# Patient Record
Sex: FEMALE | HISPANIC OR LATINO | Employment: UNEMPLOYED | URBAN - METROPOLITAN AREA
[De-identification: names, ages, dates, MRNs, and addresses within clinical notes are randomized per-mention and may not be internally consistent; named-entity substitution may affect disease eponyms.]

---

## 2023-08-29 ENCOUNTER — INITIAL PRENATAL (OUTPATIENT)
Dept: OBSTETRICS AND GYNECOLOGY | Facility: CLINIC | Age: 33
End: 2023-08-29
Payer: COMMERCIAL

## 2023-08-29 VITALS — DIASTOLIC BLOOD PRESSURE: 76 MMHG | SYSTOLIC BLOOD PRESSURE: 126 MMHG | WEIGHT: 143.2 LBS

## 2023-08-29 DIAGNOSIS — Z34.91 INITIAL OBSTETRIC VISIT IN FIRST TRIMESTER: Primary | ICD-10-CM

## 2023-08-29 LAB
B-HCG UR QL: POSITIVE
EXPIRATION DATE: ABNORMAL
GLUCOSE UR STRIP-MCNC: NEGATIVE MG/DL
INTERNAL NEGATIVE CONTROL: ABNORMAL
INTERNAL POSITIVE CONTROL: ABNORMAL
Lab: ABNORMAL
PROT UR STRIP-MCNC: NEGATIVE MG/DL

## 2023-08-29 RX ORDER — VITAMIN A, ASCORBIC ACID, CHOLECALCIFEROL, .ALPHA.-TOCOPHEROL ACETATE, DL-, THIAMINE MONONITRATE, RIBOFLAVIN, NIACINAMIDE, PYRIDOXINE HYDROCHLORIDE, FOLIC ACID, CYANOCOBALAMIN, CALCIUM CARBONATE, IRON, ZINC OXIDE, AND CUPRIC OXIDE 4000; 120; 400; 22; 1.84; 3; 20; 10; 1; 12; 200; 29; 25; 2 [IU]/1; MG/1; [IU]/1; [IU]/1; MG/1; MG/1; MG/1; MG/1; MG/1; UG/1; MG/1; MG/1; MG/1; MG/1
1 TABLET ORAL DAILY
Qty: 30 TABLET | Refills: 11 | Status: SHIPPED | OUTPATIENT
Start: 2023-08-29

## 2023-08-29 NOTE — PROGRESS NOTES
CC: Initial obstetric visit    HPI: 33-year-old  2 para 1 presents at 10-0/7 weeks by an approximate last menstrual cycle for her initial obstetric visit.  Overall, she is feeling well.  She does not have significant nausea and vomiting.  Does not have abdominal or pelvic pain.  Her obstetric history is significant for spontaneous vaginal delivery 9 years ago of a vigorous 7 pound 4 ounce female.    Review of systems    This is negative for fever or chills.  Negative for nausea or vomiting.  Negative for abdominal or pelvic pain.    Assessment and plan    1.  Intrauterine pregnancy at approximately 10 weeks gestation.  Last menstrual cycle is uncertain.  We will check an ultrasound to confirm gestational age.  2.  Prenatal counseling was undertaken and questions answered.  Prenatal vitamins prescribed.  3.  Counseled regarding genetic screening.  The patient would like to proceed with noninvasive prenatal screening when it is gestational age-appropriate.

## 2023-08-30 LAB
ABO GROUP BLD: ABNORMAL
AMPHETAMINES UR QL SCN: NEGATIVE NG/ML
BARBITURATES UR QL SCN: NEGATIVE NG/ML
BASOPHILS # BLD AUTO: 0 X10E3/UL (ref 0–0.2)
BASOPHILS NFR BLD AUTO: 0 %
BENZODIAZ UR QL: NEGATIVE NG/ML
BLD GP AB SCN SERPL QL: NEGATIVE
BZE UR QL: NEGATIVE NG/ML
CANNABINOIDS UR QL SCN: NEGATIVE NG/ML
EOSINOPHIL # BLD AUTO: 0.3 X10E3/UL (ref 0–0.4)
EOSINOPHIL NFR BLD AUTO: 2 %
ERYTHROCYTE [DISTWIDTH] IN BLOOD BY AUTOMATED COUNT: 13.2 % (ref 11.7–15.4)
HBV SURFACE AG SERPL QL IA: NEGATIVE
HCT VFR BLD AUTO: 38.5 % (ref 34–46.6)
HCV IGG SERPL QL IA: NON REACTIVE
HGB BLD-MCNC: 12.9 G/DL (ref 11.1–15.9)
HIV 1+2 AB+HIV1 P24 AG SERPL QL IA: NON REACTIVE
IMM GRANULOCYTES # BLD AUTO: 0.1 X10E3/UL (ref 0–0.1)
IMM GRANULOCYTES NFR BLD AUTO: 1 %
LYMPHOCYTES # BLD AUTO: 2.9 X10E3/UL (ref 0.7–3.1)
LYMPHOCYTES NFR BLD AUTO: 23 %
MCH RBC QN AUTO: 29.1 PG (ref 26.6–33)
MCHC RBC AUTO-ENTMCNC: 33.5 G/DL (ref 31.5–35.7)
MCV RBC AUTO: 87 FL (ref 79–97)
METHADONE UR QL SCN: NEGATIVE NG/ML
MONOCYTES # BLD AUTO: 1.1 X10E3/UL (ref 0.1–0.9)
MONOCYTES NFR BLD AUTO: 9 %
NEUTROPHILS # BLD AUTO: 8.1 X10E3/UL (ref 1.4–7)
NEUTROPHILS NFR BLD AUTO: 65 %
OPIATES UR QL: NEGATIVE NG/ML
PCP UR QL: NEGATIVE NG/ML
PLATELET # BLD AUTO: 249 X10E3/UL (ref 150–450)
PROPOXYPH UR QL SCN: NEGATIVE NG/ML
RBC # BLD AUTO: 4.43 X10E6/UL (ref 3.77–5.28)
RH BLD: POSITIVE
RPR SER QL: NON REACTIVE
RUBV IGG SERPL IA-ACNC: 1.34 INDEX
WBC # BLD AUTO: 12.5 X10E3/UL (ref 3.4–10.8)

## 2023-08-31 LAB
BACTERIA UR CULT: NORMAL
BACTERIA UR CULT: NORMAL

## 2023-09-05 LAB
C TRACH RRNA CVX QL NAA+PROBE: NEGATIVE
CYTOLOGIST CVX/VAG CYTO: ABNORMAL
CYTOLOGY CVX/VAG DOC CYTO: ABNORMAL
CYTOLOGY CVX/VAG DOC THIN PREP: ABNORMAL
DX ICD CODE: ABNORMAL
DX ICD CODE: ABNORMAL
HIV 1 & 2 AB SER-IMP: ABNORMAL
HPV I/H RISK 4 DNA CVX QL PROBE+SIG AMP: NEGATIVE
N GONORRHOEA RRNA CVX QL NAA+PROBE: NEGATIVE
OTHER STN SPEC: ABNORMAL
PATHOLOGIST CVX/VAG CYTO: ABNORMAL
RECOM F/U CVX/VAG CYTO: ABNORMAL
STAT OF ADQ CVX/VAG CYTO-IMP: ABNORMAL
T VAGINALIS RRNA SPEC QL NAA+PROBE: NEGATIVE

## 2023-09-06 ENCOUNTER — TELEPHONE (OUTPATIENT)
Dept: OBSTETRICS AND GYNECOLOGY | Facility: CLINIC | Age: 33
End: 2023-09-06
Payer: COMMERCIAL

## 2023-09-06 NOTE — TELEPHONE ENCOUNTER
Pt returning missed call and requesting to speak with you regarding pap results.     Thanks,   Norma

## 2023-09-06 NOTE — TELEPHONE ENCOUNTER
----- Message from Sae Delgadillo MD sent at 9/5/2023  3:40 PM EDT -----  Please contact the patient and let her know that her Pap showed atypical cells of undetermined significance.  Her HPV test was negative.  I recommend a repeat Pap 6 weeks postpartum.  Thank you.

## 2023-09-26 ENCOUNTER — ROUTINE PRENATAL (OUTPATIENT)
Dept: OBSTETRICS AND GYNECOLOGY | Facility: CLINIC | Age: 33
End: 2023-09-26
Payer: COMMERCIAL

## 2023-09-26 VITALS — DIASTOLIC BLOOD PRESSURE: 72 MMHG | WEIGHT: 145.4 LBS | SYSTOLIC BLOOD PRESSURE: 124 MMHG

## 2023-09-26 DIAGNOSIS — Z3A.14 14 WEEKS GESTATION OF PREGNANCY: Primary | ICD-10-CM

## 2023-09-26 NOTE — PROGRESS NOTES
CC: Obstetric visit    HPI: 33-year-old  2 para 1 presents at 14-0/7 weeks for her obstetric visit.  Overall, she is feeling well.  She has no complaints today.    Review of systems    This is negative for nausea or vomiting.  Negative for fever or chills.  Negative for abdominal or pelvic pain.    Physical examination    The abdomen is soft and gravid.  There is no epigastric tenderness.  No fundal tenderness.  No suprapubic tenderness.  Extremities are equal in size    Assessment and plan    1.  Intrauterine pregnancy at 14-0/7 weeks  2.  Prenatal labs were reviewed and discussed with the patient.  3.  Counseled regarding genetic screening.  The patient would like to proceed with noninvasive prenatal screening today.  4.  ASCUS with negative HPV on Pap.  I counseled the patient regarding the clinical significance of this and answered her questions.  We also discussed my recommendation for a repeat Pap 6 weeks postpartum.  The patient would like to proceed.

## 2023-10-03 LAB
CFDNA.FET/CFDNA.TOTAL SFR FETUS: NORMAL %
CFTR MUT ANL BLD/T: NORMAL
CITATION REF LAB TEST: NORMAL
FET 13+18+21+X+Y ANEUP PLAS.CFDNA: NEGATIVE
FET CHR 21 TS PLAS.CFDNA QL: NEGATIVE
FET SEX PLAS.CFDNA DOSAGE CFDNA: NORMAL
FET TS 13 RISK PLAS.CFDNA QL: NEGATIVE
FET TS 18 RISK WBC.DNA+CFDNA QL: NEGATIVE
GA EST FROM CONCEPTION DATE: NORMAL D
GESTATIONAL AGE > 9:: YES
LAB DIRECTOR NAME PROVIDER: NORMAL
LAB DIRECTOR NAME PROVIDER: NORMAL
LABORATORY COMMENT REPORT: NORMAL
LABORATORY COMMENT REPORT: NORMAL
LIMITATIONS OF THE TEST: NORMAL
NEGATIVE PREDICTIVE VALUE: NORMAL
NOTE: NORMAL
PERFORMANCE CHARACTERISTICS: NORMAL
POSITIVE PREDICTIVE VALUE: NORMAL
REF LAB TEST METHOD: NORMAL
TEST PERFORMANCE INFO SPEC: NORMAL

## 2023-10-06 ENCOUNTER — TELEPHONE (OUTPATIENT)
Dept: OBSTETRICS AND GYNECOLOGY | Facility: CLINIC | Age: 33
End: 2023-10-06
Payer: COMMERCIAL

## 2023-10-24 ENCOUNTER — ROUTINE PRENATAL (OUTPATIENT)
Dept: OBSTETRICS AND GYNECOLOGY | Facility: CLINIC | Age: 33
End: 2023-10-24
Payer: COMMERCIAL

## 2023-10-24 VITALS — WEIGHT: 154 LBS

## 2023-10-24 DIAGNOSIS — Z3A.18 18 WEEKS GESTATION OF PREGNANCY: Primary | ICD-10-CM

## 2023-10-24 LAB
GLUCOSE UR STRIP-MCNC: NEGATIVE MG/DL
PROT UR STRIP-MCNC: NEGATIVE MG/DL

## 2023-10-24 NOTE — PROGRESS NOTES
CC: Obstetric visit    HPI: 33-year-old  2 para 0 presents at 18-0/7 weeks for her obstetric visit.  She has begun to appreciate fetal movement.  Overall, she is feeling well.    Review of systems    This is negative for fever or chills.  Negative for nausea or vomiting.  Negative for vaginal bleeding.    Physical examination    The abdomen is soft and gravid.  There is no epigastric tenderness.  No fundal tenderness.  No suprapubic tenderness.  No CVA tenderness.  Extremities are equal in size    Assessment and plan    1.  Intrauterine pregnancy at 18-0/7 weeks  2.  Free fetal DNA testing reviewed and discussed with the patient.  Counseled regarding the option of AFP testing for spina bifida.  The patient would like to proceed today.  3.  Screening ultrasound in 2 weeks.  4.  Counseled regarding ACOG recommendations for the influenza vaccine in pregnancy.  The patient reports that she has already done her influenza vaccine.

## 2023-10-26 LAB
AFP INTERP SERPL-IMP: NORMAL
AFP INTERP SERPL-IMP: NORMAL
AFP MOM SERPL: 1.44
AFP SERPL-MCNC: 62.2 NG/ML
AGE AT DELIVERY: 31.9 YR
GA METHOD: NORMAL
GA: 18 WEEKS
IDDM PATIENT QL: NO
LABORATORY COMMENT REPORT: NORMAL
MULTIPLE PREGNANCY: NO
NEURAL TUBE DEFECT RISK FETUS: 3220 %
RESULT: NORMAL

## 2023-11-10 ENCOUNTER — DOCUMENTATION (OUTPATIENT)
Dept: OBSTETRICS AND GYNECOLOGY | Facility: CLINIC | Age: 33
End: 2023-11-10
Payer: COMMERCIAL

## 2023-11-10 DIAGNOSIS — Z3A.19 19 WEEKS GESTATION OF PREGNANCY: Primary | ICD-10-CM

## 2023-11-13 ENCOUNTER — TELEPHONE (OUTPATIENT)
Dept: OBSTETRICS AND GYNECOLOGY | Facility: CLINIC | Age: 33
End: 2023-11-13
Payer: COMMERCIAL

## 2023-11-13 NOTE — TELEPHONE ENCOUNTER
----- Message from Sae Delgadillo MD sent at 11/10/2023  1:13 PM EST -----  Please contact the patient.  Her ultrasound did not adequately visualize the baby's heart, feet or kidneys.  Also, the stomach appeared enlarged.  While this could represent a problem, it could also just be that we are too early in the pregnancy.  I would like to get a follow-up ultrasound with Dr. Vargas ([facility]) to make sure that nothing bad is going on.  Please let the patient know this and help her to schedule her appointment.  I will place an order now.

## 2023-11-20 ENCOUNTER — TRANSCRIBE ORDERS (OUTPATIENT)
Dept: ULTRASOUND IMAGING | Facility: HOSPITAL | Age: 33
End: 2023-11-20
Payer: COMMERCIAL

## 2023-11-20 DIAGNOSIS — R93.89 ABNORMAL ULTRASOUND: Primary | ICD-10-CM

## 2023-11-21 ENCOUNTER — ROUTINE PRENATAL (OUTPATIENT)
Dept: OBSTETRICS AND GYNECOLOGY | Facility: CLINIC | Age: 33
End: 2023-11-21
Payer: COMMERCIAL

## 2023-11-21 DIAGNOSIS — Z3A.22 22 WEEKS GESTATION OF PREGNANCY: Primary | ICD-10-CM

## 2023-11-21 NOTE — PROGRESS NOTES
CC: Obstetric visit    HPI: 33-year-old  2 para 1 presents at 22-0/7 weeks for her obstetric visit.  She has begun to appreciate fetal movement.  Ultrasound performed at the last visit showed possible enlargement of the fetal stomach.  Also, the placenta was close to the cervix and there was inadequate visualization of the fetal extremities and kidneys.    Review of systems    This is negative for nausea or vomiting.  Negative for fever or chills.    Physical examination    The abdomen is soft and gravid.  There is no epigastric tenderness.  No fundal tenderness.  No suprapubic tenderness.  Extremities are equal in size    Assessment and plan    1.  Intrauterine pregnancy at 22-0/7 weeks  2.  Ultrasound was reviewed and discussed with the patient.  Because of possible enlargement of the fetal stomach and inadequate visualization of extremities and kidneys as well as a possible low-lying placenta, I recommend a maternal-fetal medicine ultrasound and consult.  The patient agrees with this.  This has been scheduled for .  3.  1 hour glucose tolerance test at the next visit at this office.

## 2023-11-29 ENCOUNTER — REFERRAL TRIAGE (OUTPATIENT)
Dept: LABOR AND DELIVERY | Facility: HOSPITAL | Age: 33
End: 2023-11-29
Payer: COMMERCIAL

## 2023-12-01 ENCOUNTER — OFFICE VISIT (OUTPATIENT)
Dept: OBSTETRICS AND GYNECOLOGY | Facility: CLINIC | Age: 33
End: 2023-12-01
Payer: COMMERCIAL

## 2023-12-01 ENCOUNTER — HOSPITAL ENCOUNTER (OUTPATIENT)
Dept: ULTRASOUND IMAGING | Facility: HOSPITAL | Age: 33
Discharge: HOME | End: 2023-12-01
Admitting: OBSTETRICS & GYNECOLOGY
Payer: COMMERCIAL

## 2023-12-01 VITALS
TEMPERATURE: 98.7 F | BODY MASS INDEX: 30.21 KG/M2 | HEIGHT: 61 IN | SYSTOLIC BLOOD PRESSURE: 121 MMHG | DIASTOLIC BLOOD PRESSURE: 68 MMHG | WEIGHT: 160 LBS | HEART RATE: 106 BPM

## 2023-12-01 DIAGNOSIS — Z36.89 ENCOUNTER FOR FETAL ANATOMIC SURVEY: Primary | ICD-10-CM

## 2023-12-01 DIAGNOSIS — R93.89 ABNORMAL ULTRASOUND: ICD-10-CM

## 2023-12-01 PROCEDURE — 76811 OB US DETAILED SNGL FETUS: CPT

## 2023-12-01 PROCEDURE — 76817 TRANSVAGINAL US OBSTETRIC: CPT

## 2023-12-01 NOTE — PROGRESS NOTES
MATERNAL FETAL MEDICINE Consult Note    Dear Dr Sae Delgadillo MD:    Thank you for your kind referral of Feliberto Lim.  As you know, she is a 33 y.o.   at  24 0/7 weeks gestation (Estimated Date of Delivery: 3/26/24). This is a consult.      Her antepartum course is complicated by:  Abnormal appearing stomach--not seen today    Aneuploidy Screening: low risk cell free DNA    HPI: Today, she denies headache, blurry vision, RUQ pain. No vaginal bleeding, no contractions.     Review of History:  History reviewed. No pertinent past medical history.  History reviewed. No pertinent surgical history.    Social History     Socioeconomic History    Marital status: Single   Tobacco Use    Smoking status: Never     Passive exposure: Never    Smokeless tobacco: Never   Vaping Use    Vaping Use: Never used   Substance and Sexual Activity    Alcohol use: Never    Drug use: Never    Sexual activity: Yes     Partners: Male     History reviewed. No pertinent family history.   No Known Allergies   Current Outpatient Medications on File Prior to Visit   Medication Sig Dispense Refill    Prenatal Vit-Iron Carbonyl-FA (Prenatal Plus Iron) 29-1 MG tablet Take 1 tablet by mouth Daily. 30 tablet 11     No current facility-administered medications on file prior to visit.        Past obstetric, gynecological, medical, surgical, family and social history reviewed.  Relevant lab work and imaging reviewed.    Review of systems  Constitutional:  denies fever, chills, malaise.   ENT/Mouth:  denies sore throat, tinnitus  Eyes: denies vision changes/pain  CV:  denies chest pain  Respiratory:  denies cough/SOB  GI:  denies N/V, diarrhea, abdominal pain.    :   denies dysuria  Skin:  denies lesions or pruritus   Neuro:  denies weakness, focal neurologic symptoms    Vitals:    23 0931   BP: 121/68   BP Location: Right arm   Patient Position: Sitting   Pulse: 106   Temp: 98.7 °F (37.1 °C)   TempSrc: Temporal   Weight: 72.6 kg  "(160 lb)   Height: 155 cm (61.02\")       PHYSICAL EXAM   GENERAL: Not in acute distress, AAOx3, pleasant  CARDIO: regular rate and rhythm  PULM: symmetric chest rise, speaking in complete sentences without difficulty  NEURO: awake, alert and oriented to person, place, and time  ABDOMINAL: No fundal tenderness, no rebound or guarding, gravid  EXTREMITIES: no bilateral lower extremity edema/tenderness  SKIN: Warm, well-perfused      ULTRASOUND   Please view full ultrasound note on Imaging tab in ViewPoint.  Cephalic presentation.  Posterior low lying placenta.  MVP 3.7 cm, which is normal.    g  (52% AC 78%)  Normal appearing fetal anatomy including stomach.  CL 3.9 cm, which is normal.      ASSESSMENT/COUNSELIN y.o.   at  24 0/7 weeks gestation (Estimated Date of Delivery: 3/26/24).     -Pregnancy  [ X ] stable  [   ] improving [  ] worsening    Diagnoses and all orders for this visit:    1. Encounter for fetal anatomic survey (Primary)         Enlarged stomach, not found:  Stomach appears normal today. I reviewed the findings of today's ultrasound with the patient.  The fetal stomach appears prominent but has a normal shape and does not appear markedly enlarged. There is no dilation of the upper GI tract detectable today and no other abnormal GI findings. The amniotic fluid volume is normal. We discussed that prenatal diagnosis of enlarged stomach is mostly a normal variant, particularly when the amniotic fluid volume is normal. The stomach varies significantly in size between ultrasounds and is therefore difficult to diagnose an abnormality prenatally. The possible pathologic etiologies could include duodenal atresia, pyloric stenosis or atresia. The lack of dilation of the upper GI tract and normal AFV is reassuring. However, these findings can develop over the pregnancy, so will see in 1 month for follow up growth and make sure still normal.  Then would recommend serial growths at primary OB " out of abundance of caution.      Summary of Plan  -Serial growth ultrasounds every 4 weeks (by primary OB)   -Will do one more interval growth to see stomach.      Follow-up: 4 weeks growth    Thank you for the consult and opportunity to care for this patient.  Please feel free to reach out with any questions or concerns.      I spent 15 minutes caring for this patient on this date of service. This time includes time spent by me in the following activities: preparing for the visit, reviewing tests, obtaining and/or reviewing a separately obtained history, performing a medically appropriate examination and/or evaluation, counseling and educating the patient/family/caregiver and independently interpreting results and communicating that information with the patient/family/caregiver with greater than 50% spent in counseling and coordination of care.       I spent 3 minutes on the separately reported service of US imaging not included in the time used to support the E/M service also reported today.      Donna Vargas MD FACOG  Maternal Fetal Medicine-[facility]  Office: [phone]  [email or web address]

## 2023-12-01 NOTE — LETTER
2023     Sae Delgadillo MD  [address] [place] [address]  [address]  [place] [address]    Patient: Feliberto Lim   YOB: 1990   Date of Visit: 2023       Dear Sae Delgadillo MD,    Thank you for referring Feliberto Lim to me for evaluation. Below is a copy of my consult note.    If you have questions, please do not hesitate to call me. I look forward to following Feliberto along with you.         Sincerely,        Donna Vargas MD      MATERNAL FETAL MEDICINE Consult Note    Dear Dr Sae Delgadillo MD:    Thank you for your kind referral of Feliberto Lim.  As you know, she is a 33 y.o.   at  24 0/7 weeks gestation (Estimated Date of Delivery: 3/26/24). This is a consult.      Her antepartum course is complicated by:  Abnormal appearing stomach--not seen today    Aneuploidy Screening: low risk cell free DNA    HPI: Today, she denies headache, blurry vision, RUQ pain. No vaginal bleeding, no contractions.     Review of History:  History reviewed. No pertinent past medical history.  History reviewed. No pertinent surgical history.    Social History     Socioeconomic History   • Marital status: Single   Tobacco Use   • Smoking status: Never     Passive exposure: Never   • Smokeless tobacco: Never   Vaping Use   • Vaping Use: Never used   Substance and Sexual Activity   • Alcohol use: Never   • Drug use: Never   • Sexual activity: Yes     Partners: Male     History reviewed. No pertinent family history.   No Known Allergies   Current Outpatient Medications on File Prior to Visit   Medication Sig Dispense Refill   • Prenatal Vit-Iron Carbonyl-FA (Prenatal Plus Iron) 29-1 MG tablet Take 1 tablet by mouth Daily. 30 tablet 11     No current facility-administered medications on file prior to visit.        Past obstetric, gynecological, medical, surgical, family and social history reviewed.  Relevant lab work and imaging reviewed.    Review of systems  Constitutional:  " denies fever, chills, malaise.   ENT/Mouth:  denies sore throat, tinnitus  Eyes: denies vision changes/pain  CV:  denies chest pain  Respiratory:  denies cough/SOB  GI:  denies N/V, diarrhea, abdominal pain.    :   denies dysuria  Skin:  denies lesions or pruritus   Neuro:  denies weakness, focal neurologic symptoms    Vitals:    23 0931   BP: 121/68   BP Location: Right arm   Patient Position: Sitting   Pulse: 106   Temp: 98.7 °F (37.1 °C)   TempSrc: Temporal   Weight: 72.6 kg (160 lb)   Height: 155 cm (61.02\")       PHYSICAL EXAM   GENERAL: Not in acute distress, AAOx3, pleasant  CARDIO: regular rate and rhythm  PULM: symmetric chest rise, speaking in complete sentences without difficulty  NEURO: awake, alert and oriented to person, place, and time  ABDOMINAL: No fundal tenderness, no rebound or guarding, gravid  EXTREMITIES: no bilateral lower extremity edema/tenderness  SKIN: Warm, well-perfused      ULTRASOUND   Please view full ultrasound note on Imaging tab in ViewPoint.  Cephalic presentation.  Posterior low lying placenta.  MVP 3.7 cm, which is normal.    g  (52% AC 78%)  Normal appearing fetal anatomy including stomach.  CL 3.9 cm, which is normal.      ASSESSMENT/COUNSELIN y.o.   at  24 0/7 weeks gestation (Estimated Date of Delivery: 3/26/24).     -Pregnancy  [ X ] stable  [   ] improving [  ] worsening    Diagnoses and all orders for this visit:    1. Encounter for fetal anatomic survey (Primary)         Enlarged stomach, not found:  Stomach appears normal today. I reviewed the findings of today's ultrasound with the patient.  The fetal stomach appears prominent but has a normal shape and does not appear markedly enlarged. There is no dilation of the upper GI tract detectable today and no other abnormal GI findings. The amniotic fluid volume is normal. We discussed that prenatal diagnosis of enlarged stomach is mostly a normal variant, particularly when the amniotic fluid " volume is normal. The stomach varies significantly in size between ultrasounds and is therefore difficult to diagnose an abnormality prenatally. The possible pathologic etiologies could include duodenal atresia, pyloric stenosis or atresia. The lack of dilation of the upper GI tract and normal AFV is reassuring. However, these findings can develop over the pregnancy, so will see in 1 month for follow up growth and make sure still normal.  Then would recommend serial growths at primary OB out of abundance of caution.      Summary of Plan  -Serial growth ultrasounds every 4 weeks (by primary OB)   -Will do one more interval growth to see stomach.      Follow-up: 4 weeks growth    Thank you for the consult and opportunity to care for this patient.  Please feel free to reach out with any questions or concerns.      I spent 15 minutes caring for this patient on this date of service. This time includes time spent by me in the following activities: preparing for the visit, reviewing tests, obtaining and/or reviewing a separately obtained history, performing a medically appropriate examination and/or evaluation, counseling and educating the patient/family/caregiver and independently interpreting results and communicating that information with the patient/family/caregiver with greater than 50% spent in counseling and coordination of care.       I spent 3 minutes on the separately reported service of US imaging not included in the time used to support the E/M service also reported today.      Donna Vargas MD FACOG  Maternal Fetal Medicine-[facility]  Office: [phone]  [email or web address]

## 2023-12-01 NOTE — PROGRESS NOTES
ID #[id number] used for [facility] appointment. Pt reports that she is doing well and denies vaginal bleeding, cramping, contractions or LOF at this time. Reports active fetal movement. Reviewed when to call OB office or present to L&D for evaluation with symptoms such as decreased fetal movement, vaginal bleeding, LOF or ctxs. Pt verbalized understanding. Denies HA, visual changes or epigastric pain. Denies any additional complaints at time of appointment. Next OB appointment scheduled for 12/19.    Vitals:    12/01/23 0931   BP: 121/68   Pulse: 106   Temp: 98.7 °F (37.1 °C)

## 2023-12-06 ENCOUNTER — TRANSCRIBE ORDERS (OUTPATIENT)
Dept: ULTRASOUND IMAGING | Facility: HOSPITAL | Age: 33
End: 2023-12-06
Payer: COMMERCIAL

## 2023-12-06 DIAGNOSIS — R93.89 ABNORMAL ULTRASOUND: Primary | ICD-10-CM

## 2023-12-19 ENCOUNTER — LAB (OUTPATIENT)
Dept: OBSTETRICS AND GYNECOLOGY | Facility: CLINIC | Age: 33
End: 2023-12-19
Payer: COMMERCIAL

## 2023-12-19 ENCOUNTER — ROUTINE PRENATAL (OUTPATIENT)
Dept: OBSTETRICS AND GYNECOLOGY | Facility: CLINIC | Age: 33
End: 2023-12-19
Payer: COMMERCIAL

## 2023-12-19 VITALS — BODY MASS INDEX: 30.17 KG/M2 | WEIGHT: 159.8 LBS | DIASTOLIC BLOOD PRESSURE: 76 MMHG | SYSTOLIC BLOOD PRESSURE: 118 MMHG

## 2023-12-19 DIAGNOSIS — Z3A.22 22 WEEKS GESTATION OF PREGNANCY: ICD-10-CM

## 2023-12-19 DIAGNOSIS — Z3A.26 26 WEEKS GESTATION OF PREGNANCY: Primary | ICD-10-CM

## 2023-12-19 LAB — GLUCOSE 1H P 50 G GLC PO SERPL-MCNC: 90 MG/DL (ref 65–139)

## 2023-12-19 NOTE — PROGRESS NOTES
CC: Obstetric visit    HPI: 33-year-old  5 para 1 presents at 26-0/7 weeks for her obstetric visit.  Her baby is moving actively.  She has no complaints today.  We reviewed her visit with Dr. Vargas.  Ultrasound was performed which showed a normal fetal stomach.    Review of systems    This is negative for fever or chills.  Negative for nausea or vomiting.  Negative for vaginal bleeding.    Physical examination    The abdomen is soft and gravid.  There is no epigastric tenderness.  No right upper quadrant tenderness.  No fundal tenderness.  No CVA tenderness.  No suprapubic tenderness.  Extremities are equal in size with minimal pedal edema    Assessment and plan    1.  Intrauterine pregnancy at 26-0/7 weeks  2.  1 hour glucose tolerance test today.  3.  Maternal-fetal medicine consult was reviewed and discussed with the patient.  The fetal stomach was normal on targeted ultrasound.  Repeat ultrasound is scheduled for January.  If this is also normal, the patient can return to routine prenatal care.

## 2023-12-27 ENCOUNTER — PATIENT OUTREACH (OUTPATIENT)
Dept: LABOR AND DELIVERY | Facility: HOSPITAL | Age: 33
End: 2023-12-27
Payer: COMMERCIAL

## 2023-12-27 NOTE — OUTREACH NOTE
[facility]  Unable to Reach       Questions/Answers      Flowsheet Row Responses   Pending Outreach Confirm Patient Interest   Call Attempt First   Outcome No answer/busy   Next Call Attempt Date 12/28/23          No VM option,  [id number]    Anu S - RN  Maternity Nurse Navigator    12/27/2023, 15:10 EST

## 2024-01-03 ENCOUNTER — PATIENT OUTREACH (OUTPATIENT)
Dept: LABOR AND DELIVERY | Facility: HOSPITAL | Age: 34
End: 2024-01-03
Payer: COMMERCIAL

## 2024-01-03 NOTE — OUTREACH NOTE
[facility]  Unable to Reach       Questions/Answers      Flowsheet Row Responses   Pending Outreach Confirm Patient Interest   Call Attempt Second   Outcome No answer/busy             [id number], UTR for program, please reach out with specific patient needs.    Anu GUERRERO - RN  Maternity Nurse Navigator    1/3/2024, 11:06 EST

## 2024-01-05 ENCOUNTER — HOSPITAL ENCOUNTER (OUTPATIENT)
Dept: ULTRASOUND IMAGING | Facility: HOSPITAL | Age: 34
Discharge: HOME | End: 2024-01-05
Admitting: OBSTETRICS & GYNECOLOGY
Payer: COMMERCIAL

## 2024-01-05 ENCOUNTER — OFFICE VISIT (OUTPATIENT)
Dept: OBSTETRICS AND GYNECOLOGY | Facility: CLINIC | Age: 34
End: 2024-01-05
Payer: COMMERCIAL

## 2024-01-05 VITALS
TEMPERATURE: 98.7 F | SYSTOLIC BLOOD PRESSURE: 127 MMHG | BODY MASS INDEX: 31.15 KG/M2 | HEART RATE: 101 BPM | DIASTOLIC BLOOD PRESSURE: 73 MMHG | WEIGHT: 165 LBS

## 2024-01-05 DIAGNOSIS — R93.89 ABNORMAL ULTRASOUND: ICD-10-CM

## 2024-01-05 DIAGNOSIS — O44.43 LOW-LYING PLACENTA WITHOUT HEMORRHAGE, THIRD TRIMESTER: Primary | ICD-10-CM

## 2024-01-05 PROCEDURE — 76816 OB US FOLLOW-UP PER FETUS: CPT

## 2024-01-05 PROCEDURE — 76817 TRANSVAGINAL US OBSTETRIC: CPT

## 2024-01-05 NOTE — PROGRESS NOTES
MATERNAL FETAL MEDICINE Consult Note    Dear Dr Sae Delgadillo MD:    Thank you for your kind referral of Feliberto Lim.  As you know, she is a 33 y.o.   at  28 3/7 weeks gestation (Estimated Date of Delivery: 3/26/24). This is a consult.      Her antepartum course is complicated by:  Abnormal appearing stomach--not seen today  Low lying placenta    Aneuploidy Screening: low risk cell free DNA    HPI: Today, she denies headache, blurry vision, RUQ pain. No vaginal bleeding, no contractions.     Review of History:  History reviewed. No pertinent past medical history.  History reviewed. No pertinent surgical history.    Social History     Socioeconomic History    Marital status: Single   Tobacco Use    Smoking status: Never     Passive exposure: Never    Smokeless tobacco: Never   Vaping Use    Vaping Use: Never used   Substance and Sexual Activity    Alcohol use: Never    Drug use: Never    Sexual activity: Yes     Partners: Male     History reviewed. No pertinent family history.   No Known Allergies   Current Outpatient Medications on File Prior to Visit   Medication Sig Dispense Refill    Prenatal Vit-Iron Carbonyl-FA (Prenatal Plus Iron) 29-1 MG tablet Take 1 tablet by mouth Daily. 30 tablet 11     No current facility-administered medications on file prior to visit.        Past obstetric, gynecological, medical, surgical, family and social history reviewed.  Relevant lab work and imaging reviewed.    Review of systems  Constitutional:  denies fever, chills, malaise.   ENT/Mouth:  denies sore throat, tinnitus  Eyes: denies vision changes/pain  CV:  denies chest pain  Respiratory:  denies cough/SOB  GI:  denies N/V, diarrhea, abdominal pain.    :   denies dysuria  Skin:  denies lesions or pruritus   Neuro:  denies weakness, focal neurologic symptoms    Vitals:    24 1041   BP: 127/73   BP Location: Right arm   Patient Position: Sitting   Pulse: 101   Temp: 98.7 °F (37.1 °C)   TempSrc:  Temporal   Weight: 74.8 kg (165 lb)       PHYSICAL EXAM   GENERAL: Not in acute distress, AAOx3, pleasant  CARDIO: regular rate and rhythm  PULM: symmetric chest rise, speaking in complete sentences without difficulty  NEURO: awake, alert and oriented to person, place, and time  ABDOMINAL: No fundal tenderness, no rebound or guarding, gravid  EXTREMITIES: no bilateral lower extremity edema/tenderness  SKIN: Warm, well-perfused      ULTRASOUND   Please view full ultrasound note on Imaging tab in ViewPoint.  Cephalic presentation.  Posterior low lying placenta measuring 1 cm away from internal os.  JUJU 14 cm, which is normal.   EFW 1256 g (55%, AC 59%)  Follow up stomach views appear normal.    BPP 8/8  Transvaginal CL 4.9 cm, which is normal.      ASSESSMENT/COUNSELIN y.o.   at  28 3/7 weeks gestation (Estimated Date of Delivery: 3/26/24).     -Pregnancy  [ X ] stable  [   ] improving [  ] worsening    Diagnoses and all orders for this visit:    1. Low-lying placenta without hemorrhage, third trimester (Primary)           Enlarged stomach, not found:  Stomach appears normal today. Recommend serial growths at primary OB out of abundance of caution.      Low lying placenta:  The optimal route for delivery of pregnancies where the distance between the placental edge and internal os is 0 to 20 mm is debatable. In these cases, the fetal head may tamponade the adjacent placenta, thus preventing hemorrhage.  Although a variety of factors influence the decision to perform  birth, the following data from a meta-analysis support allowing a trial of labor in pregnancies in which the placenta is more than 10 mm from the internal os. If this distance is ?10 mm, these pregnancies are at high risk of intrapartum hemorrhage necessitating emergency  birth; therefore, I recommend considering a scheduled  section at 39 weeks to minimize this risk. However, this is a shared decision, and some  patients may choose to undertake a trial of labor since a substantial proportion will be able to successfully deliver vaginally.    We will see what her placenta is on next visit--I am hopeful it will pull up and out of the way.       A systematic review of 10 studies of nearly 600 patients with a low-lying placenta undergoing a trial of labor reported outcomes by the distance between the placental edge to the internal os:     ?0 to 10 mm - vaginal delivery: 43 percent (95% CI 28-59), emergency : 45 percent (95% CI 22-69)     ?11 to 20 mm - vaginal delivery: 85 percent (95% CI 70-96), emergency : 14 percent (95% CI 4.2-29)     ?>20 mm - vaginal delivery: 82 percent (95% CI 58-97), emergency : 10 percent (95% CI 2.2-22.3)     There was no difference in blood transfusion or postpartum hemorrhage among groups.       I relayed that there is still a high likelihood that she will not have a low lying placenta on her follow up scan at 4 weeks.   She denies vaginal bleeding and asked about modification of activities.  With her placenta 1 cm away, I do not think she needs to modify activity (pelvic rest, stop exercising) unless she has cramping, any bleeding or issues after.    She is amenable to this and will return in 1 month.      Performed visit with pacific .      Summary of Plan  -Serial growth ultrasounds every 4 weeks (by primary OB)   -Will do one more interval growth to see placenta with TVUS    Follow-up: 4 weeks growth/TVUS    Thank you for the consult and opportunity to care for this patient.  Please feel free to reach out with any questions or concerns.      I spent 10 minutes caring for this patient on this date of service. This time includes time spent by me in the following activities: preparing for the visit, reviewing tests, obtaining and/or reviewing a separately obtained history, performing a medically appropriate examination and/or evaluation, counseling and educating  the patient/family/caregiver and independently interpreting results and communicating that information with the patient/family/caregiver with greater than 50% spent in counseling and coordination of care.       I spent 3 minutes on the separately reported service of US imaging not included in the time used to support the E/M service also reported today.      Donna Vargas MD FACOG  Maternal Fetal Medicine-[facility]  Office: [phone]  [email or web address]

## 2024-01-05 NOTE — PROGRESS NOTES
ID #[id number] used for patient check in. Pt reports that she is doing well and denies vaginal bleeding, cramping, contractions or LOF at this time. Reports active fetal movement. Reviewed when to call OB office or present to L&D for evaluation with symptoms such as decreased fetal movement, vaginal bleeding, LOF or ctxs. Pt verbalized understanding. Denies HA, visual changes or epigastric pain. Denies any additional complaints at time of appointment. Next OB appointment scheduled for 1/15.    Vitals:    01/05/24 1041   BP: 127/73   Pulse: 101   Temp: 98.7 °F (37.1 °C)

## 2024-01-05 NOTE — LETTER
2024     Sae Delgadillo MD  [address] [place] [address]  [address]  [place] [address]    Patient: Feliberto Lim   YOB: 1990   Date of Visit: 2024       Dear Sae Delgadillo MD,    Thank you for referring Feliberto Lim to me for evaluation. Below is a copy of my consult note.    If you have questions, please do not hesitate to call me. I look forward to following Feliberto along with you.         Sincerely,        Donna Vargas MD      MATERNAL FETAL MEDICINE Consult Note    Dear Dr Sae Delgadillo MD:    Thank you for your kind referral of Feliberto Lim.  As you know, she is a 33 y.o.   at  28 3/7 weeks gestation (Estimated Date of Delivery: 3/26/24). This is a consult.      Her antepartum course is complicated by:  Abnormal appearing stomach--not seen today  Low lying placenta    Aneuploidy Screening: low risk cell free DNA    HPI: Today, she denies headache, blurry vision, RUQ pain. No vaginal bleeding, no contractions.     Review of History:  History reviewed. No pertinent past medical history.  History reviewed. No pertinent surgical history.    Social History     Socioeconomic History   • Marital status: Single   Tobacco Use   • Smoking status: Never     Passive exposure: Never   • Smokeless tobacco: Never   Vaping Use   • Vaping Use: Never used   Substance and Sexual Activity   • Alcohol use: Never   • Drug use: Never   • Sexual activity: Yes     Partners: Male     History reviewed. No pertinent family history.   No Known Allergies   Current Outpatient Medications on File Prior to Visit   Medication Sig Dispense Refill   • Prenatal Vit-Iron Carbonyl-FA (Prenatal Plus Iron) 29-1 MG tablet Take 1 tablet by mouth Daily. 30 tablet 11     No current facility-administered medications on file prior to visit.        Past obstetric, gynecological, medical, surgical, family and social history reviewed.  Relevant lab work and imaging reviewed.    Review of  systems  Constitutional:  denies fever, chills, malaise.   ENT/Mouth:  denies sore throat, tinnitus  Eyes: denies vision changes/pain  CV:  denies chest pain  Respiratory:  denies cough/SOB  GI:  denies N/V, diarrhea, abdominal pain.    :   denies dysuria  Skin:  denies lesions or pruritus   Neuro:  denies weakness, focal neurologic symptoms    Vitals:    24 1041   BP: 127/73   BP Location: Right arm   Patient Position: Sitting   Pulse: 101   Temp: 98.7 °F (37.1 °C)   TempSrc: Temporal   Weight: 74.8 kg (165 lb)       PHYSICAL EXAM   GENERAL: Not in acute distress, AAOx3, pleasant  CARDIO: regular rate and rhythm  PULM: symmetric chest rise, speaking in complete sentences without difficulty  NEURO: awake, alert and oriented to person, place, and time  ABDOMINAL: No fundal tenderness, no rebound or guarding, gravid  EXTREMITIES: no bilateral lower extremity edema/tenderness  SKIN: Warm, well-perfused      ULTRASOUND   Please view full ultrasound note on Imaging tab in ViewPoint.  Cephalic presentation.  Posterior low lying placenta measuring 1 cm away from internal os.  JUJU 14 cm, which is normal.   EFW 1256 g (55%, AC 59%)  Follow up stomach views appear normal.    BPP 8/8  Transvaginal CL 4.9 cm, which is normal.      ASSESSMENT/COUNSELIN y.o.   at  28 3/7 weeks gestation (Estimated Date of Delivery: 3/26/24).     -Pregnancy  [ X ] stable  [   ] improving [  ] worsening    Diagnoses and all orders for this visit:    1. Low-lying placenta without hemorrhage, third trimester (Primary)           Enlarged stomach, not found:  Stomach appears normal today. Recommend serial growths at primary OB out of abundance of caution.      Low lying placenta:  The optimal route for delivery of pregnancies where the distance between the placental edge and internal os is 0 to 20 mm is debatable. In these cases, the fetal head may tamponade the adjacent placenta, thus preventing hemorrhage.  Although a variety  of factors influence the decision to perform  birth, the following data from a meta-analysis support allowing a trial of labor in pregnancies in which the placenta is more than 10 mm from the internal os. If this distance is =10 mm, these pregnancies are at high risk of intrapartum hemorrhage necessitating emergency  birth; therefore, I recommend considering a scheduled  section at 39 weeks to minimize this risk. However, this is a shared decision, and some patients may choose to undertake a trial of labor since a substantial proportion will be able to successfully deliver vaginally.    We will see what her placenta is on next visit--I am hopeful it will pull up and out of the way.       A systematic review of 10 studies of nearly 600 patients with a low-lying placenta undergoing a trial of labor reported outcomes by the distance between the placental edge to the internal os:     ?0 to 10 mm - vaginal delivery: 43 percent (95% CI 28-59), emergency : 45 percent (95% CI 22-69)     ?11 to 20 mm - vaginal delivery: 85 percent (95% CI 70-96), emergency : 14 percent (95% CI 4.2-29)     ?>20 mm - vaginal delivery: 82 percent (95% CI 58-97), emergency : 10 percent (95% CI 2.2-22.3)     There was no difference in blood transfusion or postpartum hemorrhage among groups.       I relayed that there is still a high likelihood that she will not have a low lying placenta on her follow up scan at 4 weeks.   She denies vaginal bleeding and asked about modification of activities.  With her placenta 1 cm away, I do not think she needs to modify activity (pelvic rest, stop exercising) unless she has cramping, any bleeding or issues after.    She is amenable to this and will return in 1 month.      Performed visit with pacific .      Summary of Plan  -Serial growth ultrasounds every 4 weeks (by primary OB)   -Will do one more interval growth to see placenta with  TVUS    Follow-up: 4 weeks growth/TVUS    Thank you for the consult and opportunity to care for this patient.  Please feel free to reach out with any questions or concerns.      I spent 10 minutes caring for this patient on this date of service. This time includes time spent by me in the following activities: preparing for the visit, reviewing tests, obtaining and/or reviewing a separately obtained history, performing a medically appropriate examination and/or evaluation, counseling and educating the patient/family/caregiver and independently interpreting results and communicating that information with the patient/family/caregiver with greater than 50% spent in counseling and coordination of care.       I spent 3 minutes on the separately reported service of US imaging not included in the time used to support the E/M service also reported today.      Donna Vargas MD FACOG  Maternal Fetal Medicine-[facility]  Office: [phone]  [email or web address]

## 2024-01-15 ENCOUNTER — ROUTINE PRENATAL (OUTPATIENT)
Dept: OBSTETRICS AND GYNECOLOGY | Facility: CLINIC | Age: 34
End: 2024-01-15
Payer: COMMERCIAL

## 2024-01-15 VITALS — BODY MASS INDEX: 31.11 KG/M2 | DIASTOLIC BLOOD PRESSURE: 80 MMHG | WEIGHT: 164.8 LBS | SYSTOLIC BLOOD PRESSURE: 128 MMHG

## 2024-01-15 DIAGNOSIS — Z13.89 SCREENING FOR BLOOD OR PROTEIN IN URINE: ICD-10-CM

## 2024-01-15 DIAGNOSIS — O44.43 LOW-LYING PLACENTA WITHOUT HEMORRHAGE, THIRD TRIMESTER: Primary | ICD-10-CM

## 2024-01-15 NOTE — PROGRESS NOTES
CC: Obstetric visit    HPI: 33-year-old  5 para 1 presents at 29-6/7 weeks for her obstetric visit.  Her baby is moving actively.  She has no complaints today.  The patient saw Dr. Vargas yesterday regarding her low-lying placenta.  Ultrasound was reviewed and discussed with the patient.  There is a posterior, low-lying placenta approximately 1 cm from the cervix.    Review of systems    This is negative for fever or chills.  Negative for vaginal bleeding.  Negative for abdominal pain.    Physical examination    The abdomen is soft and gravid.  There is no epigastric tenderness.  No fundal tenderness.  No suprapubic tenderness.  Extremities are equal in size    Assessment and plan    1.  Intrauterine pregnancy at 29-6/7 weeks  2.  Low-lying placenta.  I counseled the patient regarding the clinical significance of this and answered her questions.  The patient has an additional ultrasound scheduled with Dr. Vargas for next month.  If the placenta remains low-lying, I would then recommend a  delivery.  I answered the patient's questions and she agrees with these recommendations.  Further decision making pending the results of the next ultrasound.

## 2024-01-16 LAB
GLUCOSE UR STRIP-MCNC: NEGATIVE MG/DL
PROT UR STRIP-MCNC: NEGATIVE MG/DL

## 2024-01-18 ENCOUNTER — TRANSCRIBE ORDERS (OUTPATIENT)
Dept: ULTRASOUND IMAGING | Facility: HOSPITAL | Age: 34
End: 2024-01-18
Payer: COMMERCIAL

## 2024-01-18 DIAGNOSIS — R93.89 ABNORMAL ULTRASOUND: Primary | ICD-10-CM

## 2024-01-31 PROBLEM — E78.5 HYPERLIPIDEMIA: Status: RESOLVED | Noted: 2022-11-08 | Resolved: 2024-01-31

## 2024-01-31 PROBLEM — Z98.890 HISTORY OF MEDICAL TERMINATION OF PREGNANCY: Status: RESOLVED | Noted: 2022-11-08 | Resolved: 2024-01-31

## 2024-02-02 ENCOUNTER — HOSPITAL ENCOUNTER (OUTPATIENT)
Dept: ULTRASOUND IMAGING | Facility: HOSPITAL | Age: 34
Discharge: HOME | End: 2024-02-02
Payer: COMMERCIAL

## 2024-02-02 ENCOUNTER — OFFICE VISIT (OUTPATIENT)
Dept: OBSTETRICS AND GYNECOLOGY | Facility: CLINIC | Age: 34
End: 2024-02-02
Payer: COMMERCIAL

## 2024-02-02 VITALS
DIASTOLIC BLOOD PRESSURE: 69 MMHG | HEART RATE: 105 BPM | WEIGHT: 167.4 LBS | BODY MASS INDEX: 31.61 KG/M2 | TEMPERATURE: 98.4 F | SYSTOLIC BLOOD PRESSURE: 122 MMHG

## 2024-02-02 DIAGNOSIS — R93.89 ABNORMAL ULTRASOUND: ICD-10-CM

## 2024-02-02 DIAGNOSIS — Q63.2 PELVIC KIDNEY: Primary | ICD-10-CM

## 2024-02-02 PROCEDURE — 76819 FETAL BIOPHYS PROFIL W/O NST: CPT

## 2024-02-02 PROCEDURE — 76817 TRANSVAGINAL US OBSTETRIC: CPT

## 2024-02-02 PROCEDURE — 76816 OB US FOLLOW-UP PER FETUS: CPT

## 2024-02-02 NOTE — PROGRESS NOTES
Pt reports that she is doing well and denies vaginal bleeding or LOF at this time. Notes occasional lower abdominal pressure with increase fetal movements. Reports active fetal movement. Reviewed when to call OB office or present to L&D for evaluation with symptoms such as decreased fetal movement, vaginal bleeding, LOF or ctxs. Pt verbalized understanding. Denies HA, visual changes or epigastric pain. Denies any additional complaints at time of appointment. Next OB appointment scheduled for 2/5.    Vitals:    02/02/24 1135   BP: 122/69   Pulse: 105   Temp: 98.4 °F (36.9 °C)

## 2024-02-02 NOTE — LETTER
2024     Sae Delgadillo MD  [address] [place] [address]  [address]  [place] [address]    Patient: Feliberto Lim   YOB: 1990   Date of Visit: 2024       Dear Sae Delgadillo MD:    Thank you for referring Feliberto Lim to me for evaluation. Below are the relevant portions of my assessment and plan of care.    Encounter Diagnosis and Orders:  Diagnoses and all orders for this visit:    1. Pelvic kidney (Primary)  -     Ambulatory Referral to Pediatric Urology        If you have questions, please do not hesitate to call me. I look forward to following Feliberto along with you.         Sincerely,        FRANK Hdez        CC: No Recipients                    MATERNAL FETAL MEDICINE Consult Note    Dear Dr Sae Delgadillo MD:    Thank you for your kind referral of Feliberto Lim.  As you know, she is a 33 y.o.   at  32 3/7 weeks gestation (Estimated Date of Delivery: 3/26/24). This is a consult.      Her antepartum course is complicated by:  Abnormal appearing stomach--not seen today  Low lying placenta - resolved  Pelvic Kidney    Aneuploidy Screening: low risk cell free DNA    HPI: Today, she denies headache, blurry vision, RUQ pain. No vaginal bleeding, no contractions.     Review of History:  Past Medical History:   Diagnosis Date   • History of medical termination of pregnancy 2022    Formatting of this note might be different from the original.   x5 in [place]   • Hyperlipidemia 2022     History reviewed. No pertinent surgical history.    Social History     Socioeconomic History   • Marital status: Single   Tobacco Use   • Smoking status: Never     Passive exposure: Never   • Smokeless tobacco: Never   Vaping Use   • Vaping Use: Never used   Substance and Sexual Activity   • Alcohol use: Never   • Drug use: Never   • Sexual activity: Yes     Partners: Male     History reviewed. No pertinent family history.   No Known Allergies   Current  Outpatient Medications on File Prior to Visit   Medication Sig Dispense Refill   • Prenatal Vit-Iron Carbonyl-FA (Prenatal Plus Iron) 29-1 MG tablet Take 1 tablet by mouth Daily. 30 tablet 11     No current facility-administered medications on file prior to visit.        Past obstetric, gynecological, medical, surgical, family and social history reviewed.  Relevant lab work and imaging reviewed.    Review of systems  Constitutional:  denies fever, chills, malaise.   ENT/Mouth:  denies sore throat, tinnitus  Eyes: denies vision changes/pain  CV:  denies chest pain  Respiratory:  denies cough/SOB  GI:  denies N/V, diarrhea, abdominal pain.    :   denies dysuria  Skin:  denies lesions or pruritus   Neuro:  denies weakness, focal neurologic symptoms    Vitals:    24 1135   BP: 122/69   BP Location: Right arm   Patient Position: Sitting   Pulse: 105   Temp: 98.4 °F (36.9 °C)   TempSrc: Temporal   Weight: 75.9 kg (167 lb 6.4 oz)       PHYSICAL EXAM   GENERAL: Not in acute distress, AAOx3, pleasant  CARDIO: regular rate and rhythm  PULM: symmetric chest rise, speaking in complete sentences without difficulty  NEURO: awake, alert and oriented to person, place, and time  ABDOMINAL: No fundal tenderness, no rebound or guarding, gravid  EXTREMITIES: no bilateral lower extremity edema/tenderness  SKIN: Warm, well-perfused      ULTRASOUND   Please view full ultrasound note on Imaging tab in ViewPoint.  Cephalic presentation.  Posterior placenta.  JUJU 12 cm, which is normal.  EFW 1954 g (45%, AC 55%)  BPP 8/8  Possible right pelvic kidney seen today--very difficult to visualize.  Left kidney appears normal.  Placenta does not appear to be low lying on today's exam.    ASSESSMENT/COUNSELIN y.o.   at  32 3/7 weeks gestation (Estimated Date of Delivery: 3/26/24).     -Pregnancy  [ X ] stable  [   ] improving [  ] worsening    Diagnoses and all orders for this visit:    1. Pelvic kidney (Primary)  -      Ambulatory Referral to Pediatric Urology      Enlarged stomach, not found:  Stomach appears normal today. Recommend serial growths at primary OB out of abundance of caution.      Low lying placenta:  Previously Counseled. Today placenta is not low lying. Patient aware.    Pelvic kidney:  I discussed that ectopic kidneys are usually smaller, may be malrotated, and can develop dilatation and obstruction. Sometimes they have some function.  I saw no evidence of dilatation today, but postnatally this may present a problem and need for surgical intervention. I explained that this does not alter how the baby will do in the immediate  period and should not affect her delivery planning. I explained that ectopic kidneys are a normal variant and most cause no health issues. In adults they are often found incidentally. I reassured her and recommended a post-toni evaluation and renal ultrasound.  Low RISK NIPT.     Performed visit with pacific .      Summary of Plan  -Serial growth ultrasounds every 4 weeks (MFM)   -peds urology referral  - NICU aware vs NICU consult  - added to fetal care list    Follow-up: 4 weeks growth    Thank you for the consult and opportunity to care for this patient.  Please feel free to reach out with any questions or concerns.      I spent 30 minutes caring for this patient on this date of service. This time includes time spent by me in the following activities: preparing for the visit, reviewing tests, obtaining and/or reviewing a separately obtained history, performing a medically appropriate examination and/or evaluation, counseling and educating the patient/family/caregiver and independently interpreting results and communicating that information with the patient/family/caregiver with greater than 50% spent in counseling and coordination of care.     FRANK Hendricks  Maternal Fetal Medicine-[facility]  Office: [phone]  [email or web address]

## 2024-02-02 NOTE — PROGRESS NOTES
MATERNAL FETAL MEDICINE Consult Note    Dear Dr Sae Delgadillo MD:    Thank you for your kind referral of Feliberto Lim.  As you know, she is a 33 y.o.   at  32 3/7 weeks gestation (Estimated Date of Delivery: 3/26/24). This is a consult.      Her antepartum course is complicated by:  Abnormal appearing stomach--not seen today  Low lying placenta - resolved  Pelvic Kidney    Aneuploidy Screening: low risk cell free DNA    HPI: Today, she denies headache, blurry vision, RUQ pain. No vaginal bleeding, no contractions.     Review of History:  Past Medical History:   Diagnosis Date    History of medical termination of pregnancy 2022    Formatting of this note might be different from the original.   x5 in [place]    Hyperlipidemia 2022     History reviewed. No pertinent surgical history.    Social History     Socioeconomic History    Marital status: Single   Tobacco Use    Smoking status: Never     Passive exposure: Never    Smokeless tobacco: Never   Vaping Use    Vaping Use: Never used   Substance and Sexual Activity    Alcohol use: Never    Drug use: Never    Sexual activity: Yes     Partners: Male     History reviewed. No pertinent family history.   No Known Allergies   Current Outpatient Medications on File Prior to Visit   Medication Sig Dispense Refill    Prenatal Vit-Iron Carbonyl-FA (Prenatal Plus Iron) 29-1 MG tablet Take 1 tablet by mouth Daily. 30 tablet 11     No current facility-administered medications on file prior to visit.        Past obstetric, gynecological, medical, surgical, family and social history reviewed.  Relevant lab work and imaging reviewed.    Review of systems  Constitutional:  denies fever, chills, malaise.   ENT/Mouth:  denies sore throat, tinnitus  Eyes: denies vision changes/pain  CV:  denies chest pain  Respiratory:  denies cough/SOB  GI:  denies N/V, diarrhea, abdominal pain.    :   denies dysuria  Skin:  denies lesions or pruritus   Neuro:  denies  weakness, focal neurologic symptoms    Vitals:    24 1135   BP: 122/69   BP Location: Right arm   Patient Position: Sitting   Pulse: 105   Temp: 98.4 °F (36.9 °C)   TempSrc: Temporal   Weight: 75.9 kg (167 lb 6.4 oz)       PHYSICAL EXAM   GENERAL: Not in acute distress, AAOx3, pleasant  CARDIO: regular rate and rhythm  PULM: symmetric chest rise, speaking in complete sentences without difficulty  NEURO: awake, alert and oriented to person, place, and time  ABDOMINAL: No fundal tenderness, no rebound or guarding, gravid  EXTREMITIES: no bilateral lower extremity edema/tenderness  SKIN: Warm, well-perfused      ULTRASOUND   Please view full ultrasound note on Imaging tab in ViewPoint.  Cephalic presentation.  Posterior placenta.  JUJU 12 cm, which is normal.  EFW 1954 g (45%, AC 55%)  BPP 8/8  Possible right pelvic kidney seen today--very difficult to visualize.  Left kidney appears normal.  Placenta does not appear to be low lying on today's exam.    ASSESSMENT/COUNSELIN y.o.   at  32 3/7 weeks gestation (Estimated Date of Delivery: 3/26/24).     -Pregnancy  [ X ] stable  [   ] improving [  ] worsening    Diagnoses and all orders for this visit:    1. Pelvic kidney (Primary)  -     Ambulatory Referral to Pediatric Urology      Enlarged stomach, not found:  Stomach appears normal today. Recommend serial growths at primary OB out of abundance of caution.      Low lying placenta:  Previously Counseled. Today placenta is not low lying. Patient aware.    Pelvic kidney:  I discussed that ectopic kidneys are usually smaller, may be malrotated, and can develop dilatation and obstruction. Sometimes they have some function.  I saw no evidence of dilatation today, but postnatally this may present a problem and need for surgical intervention. I explained that this does not alter how the baby will do in the immediate  period and should not affect her delivery planning. I explained that ectopic kidneys  are a normal variant and most cause no health issues. In adults they are often found incidentally. I reassured her and recommended a post-toni evaluation and renal ultrasound.  Low RISK NIPT.     Performed visit with pacific .      Summary of Plan  -Serial growth ultrasounds every 4 weeks (MFM)   -peds urology referral  - NICU aware vs NICU consult  - added to fetal care list    Follow-up: 4 weeks growth    Thank you for the consult and opportunity to care for this patient.  Please feel free to reach out with any questions or concerns.      I spent 30 minutes caring for this patient on this date of service. This time includes time spent by me in the following activities: preparing for the visit, reviewing tests, obtaining and/or reviewing a separately obtained history, performing a medically appropriate examination and/or evaluation, counseling and educating the patient/family/caregiver and independently interpreting results and communicating that information with the patient/family/caregiver with greater than 50% spent in counseling and coordination of care.     FRANK Hendricks  Maternal Fetal Medicine-[facility]  Office: [phone]  [email or web address]

## 2024-02-02 NOTE — LETTER
2024     Sae Delgadillo MD  [address] [place] [address]  [address]  [place] [address]    Patient: Feliberto Lim   YOB: 1990   Date of Visit: 2024       Dear Sae Delgadillo MD,    Thank you for referring Feliberto Lim to me for evaluation. Below is a copy of my consult note.    If you have questions, please do not hesitate to call me. I look forward to following Feliberto along with you.         Sincerely,        FRANK Hdez        CC: No Recipients    Pt reports that she is doing well and denies vaginal bleeding or LOF at this time. Notes occasional lower abdominal pressure with increase fetal movements. Reports active fetal movement. Reviewed when to call OB office or present to L&D for evaluation with symptoms such as decreased fetal movement, vaginal bleeding, LOF or ctxs. Pt verbalized understanding. Denies HA, visual changes or epigastric pain. Denies any additional complaints at time of appointment. Next OB appointment scheduled for .    Vitals:    24 1135   BP: 122/69   Pulse: 105   Temp: 98.4 °F (36.9 °C)          MATERNAL FETAL MEDICINE Consult Note    Dear Dr Sae Delgadillo MD:    Thank you for your kind referral of Feliberto Lim.  As you know, she is a 33 y.o.   at  32 3/7 weeks gestation (Estimated Date of Delivery: 3/26/24). This is a consult.      Her antepartum course is complicated by:  Abnormal appearing stomach--not seen today  Low lying placenta - resolved  Pelvic Kidney    Aneuploidy Screening: low risk cell free DNA    HPI: Today, she denies headache, blurry vision, RUQ pain. No vaginal bleeding, no contractions.     Review of History:  Past Medical History:   Diagnosis Date   • History of medical termination of pregnancy 2022    Formatting of this note might be different from the original.   x5 in [place]   • Hyperlipidemia 2022     History reviewed. No pertinent surgical history.    Social History      Socioeconomic History   • Marital status: Single   Tobacco Use   • Smoking status: Never     Passive exposure: Never   • Smokeless tobacco: Never   Vaping Use   • Vaping Use: Never used   Substance and Sexual Activity   • Alcohol use: Never   • Drug use: Never   • Sexual activity: Yes     Partners: Male     History reviewed. No pertinent family history.   No Known Allergies   Current Outpatient Medications on File Prior to Visit   Medication Sig Dispense Refill   • Prenatal Vit-Iron Carbonyl-FA (Prenatal Plus Iron) 29-1 MG tablet Take 1 tablet by mouth Daily. 30 tablet 11     No current facility-administered medications on file prior to visit.        Past obstetric, gynecological, medical, surgical, family and social history reviewed.  Relevant lab work and imaging reviewed.    Review of systems  Constitutional:  denies fever, chills, malaise.   ENT/Mouth:  denies sore throat, tinnitus  Eyes: denies vision changes/pain  CV:  denies chest pain  Respiratory:  denies cough/SOB  GI:  denies N/V, diarrhea, abdominal pain.    :   denies dysuria  Skin:  denies lesions or pruritus   Neuro:  denies weakness, focal neurologic symptoms    Vitals:    02/02/24 1135   BP: 122/69   BP Location: Right arm   Patient Position: Sitting   Pulse: 105   Temp: 98.4 °F (36.9 °C)   TempSrc: Temporal   Weight: 75.9 kg (167 lb 6.4 oz)       PHYSICAL EXAM   GENERAL: Not in acute distress, AAOx3, pleasant  CARDIO: regular rate and rhythm  PULM: symmetric chest rise, speaking in complete sentences without difficulty  NEURO: awake, alert and oriented to person, place, and time  ABDOMINAL: No fundal tenderness, no rebound or guarding, gravid  EXTREMITIES: no bilateral lower extremity edema/tenderness  SKIN: Warm, well-perfused      ULTRASOUND   Please view full ultrasound note on Imaging tab in ViewPoint.  Cephalic presentation.  Posterior placenta.  JUJU 12 cm, which is normal.  EFW 1954 g (45%, AC 55%)  BPP 8/8  Possible right pelvic kidney  seen today--very difficult to visualize.  Left kidney appears normal.  Placenta does not appear to be low lying on today's exam.    ASSESSMENT/COUNSELIN y.o.   at  32 3/7 weeks gestation (Estimated Date of Delivery: 3/26/24).     -Pregnancy  [ X ] stable  [   ] improving [  ] worsening    Diagnoses and all orders for this visit:    1. Pelvic kidney (Primary)  -     Ambulatory Referral to Pediatric Urology      Enlarged stomach, not found:  Stomach appears normal today. Recommend serial growths at primary OB out of abundance of caution.      Low lying placenta:  Previously Counseled. Today placenta is not low lying. Patient aware.    Pelvic kidney:  I discussed that ectopic kidneys are usually smaller, may be malrotated, and can develop dilatation and obstruction. Sometimes they have some function.  I saw no evidence of dilatation today, but postnatally this may present a problem and need for surgical intervention. I explained that this does not alter how the baby will do in the immediate  period and should not affect her delivery planning. I explained that ectopic kidneys are a normal variant and most cause no health issues. In adults they are often found incidentally. I reassured her and recommended a post-toni evaluation and renal ultrasound.  Low RISK NIPT.     Performed visit with pacific .      Summary of Plan  -Serial growth ultrasounds every 4 weeks (MF)   -peds urology referral  - NICU aware vs NICU consult  - added to fetal care list    Follow-up: 4 weeks growth    Thank you for the consult and opportunity to care for this patient.  Please feel free to reach out with any questions or concerns.      I spent 10 minutes caring for this patient on this date of service. This time includes time spent by me in the following activities: preparing for the visit, reviewing tests, obtaining and/or reviewing a separately obtained history, performing a medically appropriate  examination and/or evaluation, counseling and educating the patient/family/caregiver and independently interpreting results and communicating that information with the patient/family/caregiver with greater than 50% spent in counseling and coordination of care.     FRANK Hendricks  Maternal Fetal Medicine-[facility]  Office: [phone]  [email or web address]

## 2024-02-05 ENCOUNTER — ROUTINE PRENATAL (OUTPATIENT)
Dept: OBSTETRICS AND GYNECOLOGY | Facility: CLINIC | Age: 34
End: 2024-02-05
Payer: COMMERCIAL

## 2024-02-05 VITALS — SYSTOLIC BLOOD PRESSURE: 117 MMHG | BODY MASS INDEX: 31.38 KG/M2 | DIASTOLIC BLOOD PRESSURE: 79 MMHG | WEIGHT: 166.2 LBS

## 2024-02-05 DIAGNOSIS — Z3A.32 32 WEEKS GESTATION OF PREGNANCY: Primary | ICD-10-CM

## 2024-02-05 LAB
GLUCOSE UR STRIP-MCNC: NEGATIVE MG/DL
PROT UR STRIP-MCNC: NEGATIVE MG/DL

## 2024-02-05 PROCEDURE — 99213 OFFICE O/P EST LOW 20 MIN: CPT | Performed by: OBSTETRICS & GYNECOLOGY

## 2024-02-05 NOTE — PROGRESS NOTES
CC: Obstetric visit    HPI: 33-year-old  5 para 1 presents at 32-6/7 weeks for her obstetric visit.  Her baby is moving actively.  She has no complaints today.  She recently had her maternal-fetal medicine visit and we reviewed this today.    Review of systems    This is negative for fever or chills.  Negative for nausea or vomiting.  Negative for vaginal bleeding.    Physical examination    The abdomen is soft and gravid.  There is no epigastric tenderness.  No fundal tenderness.  No suprapubic tenderness.  No CVA tenderness.  Extremities are equal in size with minimal pedal edema    Assessment and plan    1.  Intrauterine pregnancy at 32-6/7 weeks  2.  Maternal-fetal medicine visit was reviewed and discussed with the patient.  20 minutes were spent in direct face-to-face counseling regarding these issues.  First, the patient's low-lying placenta has completely resolved.  Next, we were unable to adequately visualize the stomach in our office.  Maternal-fetal medicine ultrasound demonstrates a normal fetal stomach.  Next, there was a possible pelvic kidney.  We discussed the clinical significance of this and I answered the patient's questions.  She has a referral to urology made by maternal-fetal medicine.  This will allow further information on this issue.

## 2024-02-19 ENCOUNTER — ROUTINE PRENATAL (OUTPATIENT)
Dept: OBSTETRICS AND GYNECOLOGY | Facility: CLINIC | Age: 34
End: 2024-02-19
Payer: COMMERCIAL

## 2024-02-19 VITALS — SYSTOLIC BLOOD PRESSURE: 117 MMHG | DIASTOLIC BLOOD PRESSURE: 77 MMHG | BODY MASS INDEX: 31.79 KG/M2 | WEIGHT: 168.4 LBS

## 2024-02-19 DIAGNOSIS — Z3A.34 34 WEEKS GESTATION OF PREGNANCY: Primary | ICD-10-CM

## 2024-02-19 PROCEDURE — 99213 OFFICE O/P EST LOW 20 MIN: CPT | Performed by: OBSTETRICS & GYNECOLOGY

## 2024-02-19 NOTE — PROGRESS NOTES
CC: Obstetric visit    HPI: 33-year-old  6 para 1 presents at 34-6/7 weeks for her obstetric visit.  Her baby is moving actively.  She has no contractions today.    Review of systems    This is negative for nausea or vomiting.  Negative for fever or chills.  Negative for vaginal bleeding.    Physical examination    The abdomen is soft and gravid.  There is no epigastric tenderness.  No fundal tenderness.  No CVA tenderness.  No suprapubic tenderness.  Extremities are equal in size    Assessment and plan    1.  Intrauterine pregnancy at 34-6/7 weeks  2.  Group B strep cultures at the next visit.  Counseled and questions answered.  3.  Possible pelvic kidney found on maternal-fetal ultrasound.  Counseled.  The patient has not yet been scheduled for a pediatric urology consult, though the referral has been placed.

## 2024-02-29 ENCOUNTER — TRANSCRIBE ORDERS (OUTPATIENT)
Dept: ULTRASOUND IMAGING | Facility: HOSPITAL | Age: 34
End: 2024-02-29
Payer: COMMERCIAL

## 2024-02-29 DIAGNOSIS — R93.89 ABNORMAL ULTRASOUND: Primary | ICD-10-CM

## 2024-03-01 ENCOUNTER — HOSPITAL ENCOUNTER (OUTPATIENT)
Dept: ULTRASOUND IMAGING | Facility: HOSPITAL | Age: 34
Discharge: HOME | End: 2024-03-01
Payer: COMMERCIAL

## 2024-03-01 ENCOUNTER — OFFICE VISIT (OUTPATIENT)
Dept: OBSTETRICS AND GYNECOLOGY | Facility: CLINIC | Age: 34
End: 2024-03-01
Payer: COMMERCIAL

## 2024-03-01 VITALS
BODY MASS INDEX: 32.44 KG/M2 | SYSTOLIC BLOOD PRESSURE: 117 MMHG | DIASTOLIC BLOOD PRESSURE: 66 MMHG | HEART RATE: 120 BPM | WEIGHT: 171.8 LBS | TEMPERATURE: 98.4 F

## 2024-03-01 DIAGNOSIS — Q63.2 PELVIC KIDNEY: Primary | ICD-10-CM

## 2024-03-01 DIAGNOSIS — R93.89 ABNORMAL ULTRASOUND: ICD-10-CM

## 2024-03-01 PROCEDURE — 76816 OB US FOLLOW-UP PER FETUS: CPT

## 2024-03-01 PROCEDURE — 76819 FETAL BIOPHYS PROFIL W/O NST: CPT

## 2024-03-01 NOTE — PROGRESS NOTES
MATERNAL FETAL MEDICINE Consult Note    Dear Dr Sae Delgadillo MD:    Thank you for your kind referral of Feilberto Lim.  As you know, she is a 33 y.o.   at  36 3/7 weeks gestation (Estimated Date of Delivery: 3/26/24). This is a consult.      Her antepartum course is complicated by:  Pelvic Kidney    Aneuploidy Screening: low risk cell free DNA    HPI: Today, she denies headache, blurry vision, RUQ pain. No vaginal bleeding, no contractions.     Review of History:  Past Medical History:   Diagnosis Date    History of medical termination of pregnancy 2022    Formatting of this note might be different from the original.   x5 in [place]    Hyperlipidemia 2022     History reviewed. No pertinent surgical history.    Social History     Socioeconomic History    Marital status: Single   Tobacco Use    Smoking status: Never     Passive exposure: Never    Smokeless tobacco: Never   Vaping Use    Vaping Use: Never used   Substance and Sexual Activity    Alcohol use: Never    Drug use: Never    Sexual activity: Yes     Partners: Male     History reviewed. No pertinent family history.   No Known Allergies   Current Outpatient Medications on File Prior to Visit   Medication Sig Dispense Refill    Prenatal Vit-Iron Carbonyl-FA (Prenatal Plus Iron) 29-1 MG tablet Take 1 tablet by mouth Daily. 30 tablet 11     No current facility-administered medications on file prior to visit.        Past obstetric, gynecological, medical, surgical, family and social history reviewed.  Relevant lab work and imaging reviewed.    Review of systems  Constitutional:  denies fever, chills, malaise.   ENT/Mouth:  denies sore throat, tinnitus  Eyes: denies vision changes/pain  CV:  denies chest pain  Respiratory:  denies cough/SOB  GI:  denies N/V, diarrhea, abdominal pain.    :   denies dysuria  Skin:  denies lesions or pruritus   Neuro:  denies weakness, focal neurologic symptoms    Vitals:    24 1120   BP: 117/66   BP  Location: Right arm   Patient Position: Sitting   Pulse: 120   Temp: 98.4 °F (36.9 °C)   TempSrc: Temporal   Weight: 77.9 kg (171 lb 12.8 oz)     Repeat pulse 113.  Gave precautions.      PHYSICAL EXAM   GENERAL: Not in acute distress, AAOx3, pleasant  CARDIO: regular rate and rhythm  PULM: symmetric chest rise, speaking in complete sentences without difficulty  NEURO: awake, alert and oriented to person, place, and time  ABDOMINAL: No fundal tenderness, no rebound or guarding, gravid  EXTREMITIES: no bilateral lower extremity edema/tenderness  SKIN: Warm, well-perfused      ULTRASOUND   Please view full ultrasound note on Imaging tab in ViewPoint.  Cephalic presentation.  Posterior placenta.  JUJU 8 cm, which is normal.   EFW 2938 g (50% AC 87%)  BPP     ASSESSMENT/COUNSELIN y.o.   at  36 3/7 weeks gestation (Estimated Date of Delivery: 3/26/24).     -Pregnancy  [ X ] stable  [   ] improving [  ] worsening    Diagnoses and all orders for this visit:    1. Pelvic kidney (Primary)        Pelvic kidney:  Previously counseled: I discussed that ectopic kidneys are usually smaller, may be malrotated, and can develop dilatation and obstruction. Sometimes they have some function.  I saw no evidence of dilatation today, but postnatally this may present a problem and need for surgical intervention. She has seen urology and been counseled--needs a  renal US.  Low RISK NIPT.     Performed visit with pacific .      Summary of Plan  -Serial growth ultrasounds every 4 weeks (OB)  -S/p peds urology  - Needs  fetal US  -Continue care with primary OB    Follow-up: no follow up scheduled--happy to see at request of primary OB or pt    Thank you for the consult and opportunity to care for this patient.  Please feel free to reach out with any questions or concerns.      I spent 17 minutes caring for this patient on this date of service. This time includes time spent by me in the following  activities: preparing for the visit, reviewing tests, obtaining and/or reviewing a separately obtained history, performing a medically appropriate examination and/or evaluation, counseling and educating the patient/family/caregiver and independently interpreting results and communicating that information with the patient/family/caregiver with greater than 50% spent in counseling and coordination of care.     4 minutes reading US    Donna Vargas MD FACOG  Maternal Fetal Medicine-[facility]  Office: [phone]  [email or web address]

## 2024-03-01 NOTE — PROGRESS NOTES
ID #[id number] used for [facility] appointment. Pt reports that she is doing well and denies vaginal bleeding, cramping, contractions or LOF at this time. Reports active fetal movement. Reviewed when to call OB office or present to L&D for evaluation with symptoms such as decreased fetal movement, vaginal bleeding, LOF or ctxs. Pt verbalized understanding. Denies HA, visual changes or epigastric pain at this time. Reports feeling like her heart is racing at today's appointment. Heart rate ranging from 120-129 while getting checked in. Will recheck heart rate after ultrasound. MFM aware. Next OB appointment scheduled for 3/6.    Vitals:    03/01/24 1120   BP: 117/66   Pulse: 120   Temp: 98.4 °F (36.9 °C)

## 2024-03-01 NOTE — LETTER
2024     Sae Delgadillo MD  [address] [place] [address]  [address]  [place] [address]    Patient: Feliberto Lim   YOB: 1990   Date of Visit: 3/1/2024       Dear Sae Delgadillo MD,    Thank you for referring Feliberto Lim to me for evaluation. Below is a copy of my consult note.    If you have questions, please do not hesitate to call me. I look forward to following Feliberto along with you.         Sincerely,        Donna Vargas MD      MATERNAL FETAL MEDICINE Consult Note    Dear Dr Sae Delgadillo MD:    Thank you for your kind referral of Feliberto Lim.  As you know, she is a 33 y.o.   at  36 3/7 weeks gestation (Estimated Date of Delivery: 3/26/24). This is a consult.      Her antepartum course is complicated by:  Pelvic Kidney    Aneuploidy Screening: low risk cell free DNA    HPI: Today, she denies headache, blurry vision, RUQ pain. No vaginal bleeding, no contractions.     Review of History:  Past Medical History:   Diagnosis Date   • History of medical termination of pregnancy 2022    Formatting of this note might be different from the original.   x5 in [place]   • Hyperlipidemia 2022     History reviewed. No pertinent surgical history.    Social History     Socioeconomic History   • Marital status: Single   Tobacco Use   • Smoking status: Never     Passive exposure: Never   • Smokeless tobacco: Never   Vaping Use   • Vaping Use: Never used   Substance and Sexual Activity   • Alcohol use: Never   • Drug use: Never   • Sexual activity: Yes     Partners: Male     History reviewed. No pertinent family history.   No Known Allergies   Current Outpatient Medications on File Prior to Visit   Medication Sig Dispense Refill   • Prenatal Vit-Iron Carbonyl-FA (Prenatal Plus Iron) 29-1 MG tablet Take 1 tablet by mouth Daily. 30 tablet 11     No current facility-administered medications on file prior to visit.        Past obstetric, gynecological, medical,  surgical, family and social history reviewed.  Relevant lab work and imaging reviewed.    Review of systems  Constitutional:  denies fever, chills, malaise.   ENT/Mouth:  denies sore throat, tinnitus  Eyes: denies vision changes/pain  CV:  denies chest pain  Respiratory:  denies cough/SOB  GI:  denies N/V, diarrhea, abdominal pain.    :   denies dysuria  Skin:  denies lesions or pruritus   Neuro:  denies weakness, focal neurologic symptoms    Vitals:    24 1120   BP: 117/66   BP Location: Right arm   Patient Position: Sitting   Pulse: 120   Temp: 98.4 °F (36.9 °C)   TempSrc: Temporal   Weight: 77.9 kg (171 lb 12.8 oz)     Repeat pulse 113.  Gave precautions.      PHYSICAL EXAM   GENERAL: Not in acute distress, AAOx3, pleasant  CARDIO: regular rate and rhythm  PULM: symmetric chest rise, speaking in complete sentences without difficulty  NEURO: awake, alert and oriented to person, place, and time  ABDOMINAL: No fundal tenderness, no rebound or guarding, gravid  EXTREMITIES: no bilateral lower extremity edema/tenderness  SKIN: Warm, well-perfused      ULTRASOUND   Please view full ultrasound note on Imaging tab in ViewPoint.  Cephalic presentation.  Posterior placenta.  JUJU 8 cm, which is normal.   EFW 2938 g (50% AC 87%)  BPP 8/8    ASSESSMENT/COUNSELIN y.o.   at  36 3/7 weeks gestation (Estimated Date of Delivery: 3/26/24).     -Pregnancy  [ X ] stable  [   ] improving [  ] worsening    Diagnoses and all orders for this visit:    1. Pelvic kidney (Primary)        Pelvic kidney:  Previously counseled: I discussed that ectopic kidneys are usually smaller, may be malrotated, and can develop dilatation and obstruction. Sometimes they have some function.  I saw no evidence of dilatation today, but postnatally this may present a problem and need for surgical intervention. She has seen urology and been counseled--needs a  renal US.  Low RISK NIPT.     Performed visit with pacific  .      Summary of Plan  -Serial growth ultrasounds every 4 weeks (OB)  -S/p peds urology  - Needs  fetal US  -Continue care with primary OB    Follow-up: no follow up scheduled--happy to see at request of primary OB or pt    Thank you for the consult and opportunity to care for this patient.  Please feel free to reach out with any questions or concerns.      I spent 17 minutes caring for this patient on this date of service. This time includes time spent by me in the following activities: preparing for the visit, reviewing tests, obtaining and/or reviewing a separately obtained history, performing a medically appropriate examination and/or evaluation, counseling and educating the patient/family/caregiver and independently interpreting results and communicating that information with the patient/family/caregiver with greater than 50% spent in counseling and coordination of care.     4 minutes reading US    Donna Vargas MD FACOG  Maternal Fetal Medicine-[facility]  Office: [phone]  [email or web address]

## 2024-03-06 ENCOUNTER — ROUTINE PRENATAL (OUTPATIENT)
Dept: OBSTETRICS AND GYNECOLOGY | Facility: CLINIC | Age: 34
End: 2024-03-06
Payer: COMMERCIAL

## 2024-03-06 ENCOUNTER — DOCUMENTATION (OUTPATIENT)
Dept: NURSERY | Facility: HOSPITAL | Age: 34
End: 2024-03-06
Payer: COMMERCIAL

## 2024-03-06 VITALS — SYSTOLIC BLOOD PRESSURE: 110 MMHG | WEIGHT: 171.8 LBS | DIASTOLIC BLOOD PRESSURE: 68 MMHG | BODY MASS INDEX: 32.44 KG/M2

## 2024-03-06 DIAGNOSIS — Z3A.37 37 WEEKS GESTATION OF PREGNANCY: ICD-10-CM

## 2024-03-06 DIAGNOSIS — Z36.85 ANTENATAL SCREENING FOR STREPTOCOCCUS B: Primary | ICD-10-CM

## 2024-03-06 NOTE — PROGRESS NOTES
DATE: 3/6/2024  MRN: [id number]      Patient Name: Feliberto Lim  YOB: 1990    Dear Health Care Provider,    The patient listed above was discussed at the [facility] Fetal Care Conference.     The fetal diagnosis is: pelvic kidney    Recommendations:  Delivery is currently planned at [facility]    Plan is for the baby to be admitted to the  Nursery if there are no additional concerns    After admission the plan is for Renal Ultrasound after 48 hours    Please be aware that the plan may change if more information is obtained during the prenatal course.    Please feel free to call with any questions:    Maternal Fetal Medicine at [phone]  Pediatric Cardiology at [phone]  Neonatology at [phone]    Electronically signed by Crow Thomas MD, 24, 11:42 AM EST.

## 2024-03-06 NOTE — PROGRESS NOTES
CC: Obstetric visit    HPI: 33-year-old  6 para 1 presents at 37-1/7 weeks for her obstetric visit.  Her baby is moving actively.  She has no complaints today.    Review of systems    This is negative for fever or chills.  Negative for nausea or vomiting.  Negative for vaginal bleeding.    Physical examination    The abdomen is soft and gravid.  There is no epigastric tenderness.  No fundal tenderness.  No CVA tenderness.  No suprapubic tenderness.  Pelvic examination reveals normal female external genitalia.  The cervix is posterior and 1 cm dilated.  There is no blood in the vaginal vault.  Extremities are equal in size    Assessment and plan    1.  Intrauterine pregnancy at 37-1/7 weeks  2.  Group B strep cultures performed today.  Counseled.  3.  Possible fetal pelvic kidney.  Counseled.  We reviewed maternal-fetal medicine and pediatric urology consults on this.  It appears that no  changes have been recommended and that this will be further evaluated postpartum.

## 2024-03-10 LAB — B-HEM STREP SPEC QL CULT: NEGATIVE

## 2024-03-13 ENCOUNTER — ROUTINE PRENATAL (OUTPATIENT)
Dept: OBSTETRICS AND GYNECOLOGY | Facility: CLINIC | Age: 34
End: 2024-03-13
Payer: COMMERCIAL

## 2024-03-13 DIAGNOSIS — Z3A.37 37 WEEKS GESTATION OF PREGNANCY: Primary | ICD-10-CM

## 2024-03-13 NOTE — PROGRESS NOTES
CC: Obstetric visit    HPI: 33-year-old  6 para 1 presents at 38-1/7 weeks for her obstetric visit.  Her baby is moving actively.  She has rare contractions.    Review of systems    This is negative for fever or chills.  Negative for nausea or vomiting.  Negative for vaginal bleeding.    Physical examination    The abdomen is soft and gravid.  There is no epigastric tenderness.  No CVA tenderness.  No fundal tenderness.  Pelvic examination reveals normal female external genitalia.  There is no blood in the vaginal vault.  The cervix is 50% effaced and 1 cm dilated.  Extremities are equal in size    Assessment and plan    1.  Intrauterine pregnancy at 38-1/7 weeks  2.  Counseled regarding management of the remainder of the pregnancy.  The patient would like to consider induction of labor if she remains pregnant at 39 weeks.

## 2024-03-20 ENCOUNTER — ROUTINE PRENATAL (OUTPATIENT)
Dept: OBSTETRICS AND GYNECOLOGY | Facility: CLINIC | Age: 34
End: 2024-03-20
Payer: COMMERCIAL

## 2024-03-20 VITALS — SYSTOLIC BLOOD PRESSURE: 125 MMHG | BODY MASS INDEX: 32.47 KG/M2 | WEIGHT: 172 LBS | DIASTOLIC BLOOD PRESSURE: 82 MMHG

## 2024-03-20 DIAGNOSIS — Z3A.39 39 WEEKS GESTATION OF PREGNANCY: Primary | ICD-10-CM

## 2024-03-20 LAB
GLUCOSE UR STRIP-MCNC: NEGATIVE MG/DL
PROT UR STRIP-MCNC: ABNORMAL MG/DL

## 2024-03-20 NOTE — PROGRESS NOTES
CC: Obstetric visit    HPI: 33-year-old  6 para 1 presents at 39-1/7 weeks for her obstetric visit.  Her baby is moving actively.  She has occasional contractions.    Review of systems    This is negative for fever or chills.  Negative for nausea or vomiting.  Negative for vaginal bleeding.    Physical examination    The abdomen is soft and gravid.  There is no epigastric tenderness.  No fundal tenderness.  No suprapubic tenderness.  Pelvic examination reveals normal female external genitalia.  There is no blood in the vaginal vault.  Cervix is 70% effaced at 1.5 cm dilated  Extremities are equal in size    Assessment and plan    1.  Intrauterine pregnancy at 39-1/7 weeks  2.  The patient would like to be induced next week if she is not delivered.  We discussed induction of labor and I answered her questions.  She would like to proceed.  This has been scheduled for Wednesday morning.

## 2024-03-27 ENCOUNTER — HOSPITAL ENCOUNTER (EMERGENCY)
Dept: LABOR AND DELIVERY | Facility: HOSPITAL | Age: 34
Discharge: HOME | End: 2024-03-27
Payer: COMMERCIAL

## 2024-03-27 ENCOUNTER — ANESTHESIA (OUTPATIENT)
Dept: EMERGENCY DEPT | Facility: HOSPITAL | Age: 34
End: 2024-03-27
Payer: COMMERCIAL

## 2024-03-27 ENCOUNTER — HOSPITAL ENCOUNTER (INPATIENT)
Facility: HOSPITAL | Age: 34
LOS: 2 days | Discharge: HOME | End: 2024-03-29
Attending: OBSTETRICS & GYNECOLOGY | Admitting: OBSTETRICS & GYNECOLOGY
Payer: COMMERCIAL

## 2024-03-27 ENCOUNTER — ANESTHESIA EVENT (OUTPATIENT)
Dept: EMERGENCY DEPT | Facility: HOSPITAL | Age: 34
End: 2024-03-27
Payer: COMMERCIAL

## 2024-03-27 PROBLEM — Z34.90 PREGNANCY: Status: ACTIVE | Noted: 2024-03-27

## 2024-03-27 LAB
A1 MICROGLOB PLACENTAL VAG QL: POSITIVE
ABO GROUP BLD: NORMAL
ALBUMIN SERPL-MCNC: 3.5 G/DL (ref 3.5–5.2)
ALBUMIN/GLOB SERPL: 1.2 G/DL
ALP SERPL-CCNC: 240 U/L (ref 39–117)
ALT SERPL W P-5'-P-CCNC: 35 U/L (ref 1–33)
ANION GAP SERPL CALCULATED.3IONS-SCNC: 12.3 MMOL/L (ref 5–15)
AST SERPL-CCNC: 23 U/L (ref 1–32)
BASOPHILS # BLD AUTO: 0.02 10*3/MM3 (ref 0–0.2)
BASOPHILS NFR BLD AUTO: 0.2 % (ref 0–1.5)
BILIRUB SERPL-MCNC: <0.2 MG/DL (ref 0–1.2)
BILIRUB UR QL STRIP: NEGATIVE
BLD GP AB SCN SERPL QL: NEGATIVE
BUN SERPL-MCNC: 6 MG/DL (ref 6–20)
BUN/CREAT SERPL: 11.8 (ref 7–25)
CALCIUM SPEC-SCNC: 8.9 MG/DL (ref 8.6–10.5)
CHLORIDE SERPL-SCNC: 104 MMOL/L (ref 98–107)
CLARITY UR: ABNORMAL
CO2 SERPL-SCNC: 19.7 MMOL/L (ref 22–29)
COLOR UR: YELLOW
CREAT SERPL-MCNC: 0.51 MG/DL (ref 0.57–1)
CREAT UR-MCNC: 89.7 MG/DL
DEPRECATED RDW RBC AUTO: 46.3 FL (ref 37–54)
EGFRCR SERPLBLD CKD-EPI 2021: 128.2 ML/MIN/1.73
EOSINOPHIL # BLD AUTO: 0.08 10*3/MM3 (ref 0–0.4)
EOSINOPHIL NFR BLD AUTO: 0.9 % (ref 0.3–6.2)
ERYTHROCYTE [DISTWIDTH] IN BLOOD BY AUTOMATED COUNT: 15.7 % (ref 12.3–15.4)
GLOBULIN UR ELPH-MCNC: 3 GM/DL
GLUCOSE SERPL-MCNC: 78 MG/DL (ref 65–99)
GLUCOSE UR STRIP-MCNC: NEGATIVE MG/DL
HCT VFR BLD AUTO: 38.8 % (ref 34–46.6)
HGB BLD-MCNC: 13.1 G/DL (ref 12–15.9)
HGB UR QL STRIP.AUTO: NEGATIVE
IMM GRANULOCYTES # BLD AUTO: 0.12 10*3/MM3 (ref 0–0.05)
IMM GRANULOCYTES NFR BLD AUTO: 1.3 % (ref 0–0.5)
KETONES UR QL STRIP: NEGATIVE
LEUKOCYTE ESTERASE UR QL STRIP.AUTO: NEGATIVE
LYMPHOCYTES # BLD AUTO: 1.78 10*3/MM3 (ref 0.7–3.1)
LYMPHOCYTES NFR BLD AUTO: 19 % (ref 19.6–45.3)
MCH RBC QN AUTO: 27.5 PG (ref 26.6–33)
MCHC RBC AUTO-ENTMCNC: 33.8 G/DL (ref 31.5–35.7)
MCV RBC AUTO: 81.5 FL (ref 79–97)
MONOCYTES # BLD AUTO: 1.08 10*3/MM3 (ref 0.1–0.9)
MONOCYTES NFR BLD AUTO: 11.5 % (ref 5–12)
NEUTROPHILS NFR BLD AUTO: 6.31 10*3/MM3 (ref 1.7–7)
NEUTROPHILS NFR BLD AUTO: 67.1 % (ref 42.7–76)
NITRITE UR QL STRIP: NEGATIVE
NRBC BLD AUTO-RTO: 0 /100 WBC (ref 0–0.2)
PH UR STRIP.AUTO: 7.5 [PH] (ref 5–8)
PLATELET # BLD AUTO: 185 10*3/MM3 (ref 140–450)
PMV BLD AUTO: 12 FL (ref 6–12)
POTASSIUM SERPL-SCNC: 3.7 MMOL/L (ref 3.5–5.2)
PROT ?TM UR-MCNC: 17.3 MG/DL
PROT SERPL-MCNC: 6.5 G/DL (ref 6–8.5)
PROT UR QL STRIP: ABNORMAL
PROT/CREAT UR: 192.9 MG/G CREA (ref 0–200)
RBC # BLD AUTO: 4.76 10*6/MM3 (ref 3.77–5.28)
RH BLD: POSITIVE
SODIUM SERPL-SCNC: 136 MMOL/L (ref 136–145)
SP GR UR STRIP: 1.01 (ref 1–1.03)
T PALLIDUM IGG SER QL: NORMAL
T&S EXPIRATION DATE: NORMAL
UROBILINOGEN UR QL STRIP: ABNORMAL
WBC NRBC COR # BLD AUTO: 9.39 10*3/MM3 (ref 3.4–10.8)

## 2024-03-27 PROCEDURE — 87086 URINE CULTURE/COLONY COUNT: CPT | Performed by: OBSTETRICS & GYNECOLOGY

## 2024-03-27 PROCEDURE — 59410 OBSTETRICAL CARE: CPT | Performed by: OBSTETRICS & GYNECOLOGY

## 2024-03-27 PROCEDURE — 84156 ASSAY OF PROTEIN URINE: CPT | Performed by: OBSTETRICS & GYNECOLOGY

## 2024-03-27 PROCEDURE — C1755 CATHETER, INTRASPINAL: HCPCS

## 2024-03-27 PROCEDURE — 88307 TISSUE EXAM BY PATHOLOGIST: CPT

## 2024-03-27 PROCEDURE — 86901 BLOOD TYPING SEROLOGIC RH(D): CPT | Performed by: OBSTETRICS & GYNECOLOGY

## 2024-03-27 PROCEDURE — 99221 1ST HOSP IP/OBS SF/LOW 40: CPT | Performed by: OBSTETRICS & GYNECOLOGY

## 2024-03-27 PROCEDURE — 25810000003 LACTATED RINGERS PER 1000 ML: Performed by: OBSTETRICS & GYNECOLOGY

## 2024-03-27 PROCEDURE — 82570 ASSAY OF URINE CREATININE: CPT | Performed by: OBSTETRICS & GYNECOLOGY

## 2024-03-27 PROCEDURE — 80053 COMPREHEN METABOLIC PANEL: CPT | Performed by: OBSTETRICS & GYNECOLOGY

## 2024-03-27 PROCEDURE — C1755 CATHETER, INTRASPINAL: HCPCS | Performed by: ANESTHESIOLOGY

## 2024-03-27 PROCEDURE — 86850 RBC ANTIBODY SCREEN: CPT | Performed by: OBSTETRICS & GYNECOLOGY

## 2024-03-27 PROCEDURE — 86900 BLOOD TYPING SEROLOGIC ABO: CPT | Performed by: OBSTETRICS & GYNECOLOGY

## 2024-03-27 PROCEDURE — 99202 OFFICE O/P NEW SF 15 MIN: CPT | Performed by: OBSTETRICS & GYNECOLOGY

## 2024-03-27 PROCEDURE — 84112 EVAL AMNIOTIC FLUID PROTEIN: CPT | Performed by: OBSTETRICS & GYNECOLOGY

## 2024-03-27 PROCEDURE — 86780 TREPONEMA PALLIDUM: CPT | Performed by: OBSTETRICS & GYNECOLOGY

## 2024-03-27 PROCEDURE — 85025 COMPLETE CBC W/AUTO DIFF WBC: CPT | Performed by: OBSTETRICS & GYNECOLOGY

## 2024-03-27 PROCEDURE — 81003 URINALYSIS AUTO W/O SCOPE: CPT | Performed by: OBSTETRICS & GYNECOLOGY

## 2024-03-27 RX ORDER — ONDANSETRON 2 MG/ML
4 INJECTION INTRAMUSCULAR; INTRAVENOUS ONCE AS NEEDED
OUTPATIENT
Start: 2024-03-27

## 2024-03-27 RX ORDER — OXYTOCIN/0.9 % SODIUM CHLORIDE 30/500 ML
250 PLASTIC BAG, INJECTION (ML) INTRAVENOUS CONTINUOUS
Status: DISPENSED | OUTPATIENT
Start: 2024-03-27 | End: 2024-03-28

## 2024-03-27 RX ORDER — SODIUM CHLORIDE, SODIUM LACTATE, POTASSIUM CHLORIDE, CALCIUM CHLORIDE 600; 310; 30; 20 MG/100ML; MG/100ML; MG/100ML; MG/100ML
125 INJECTION, SOLUTION INTRAVENOUS CONTINUOUS
Status: DISCONTINUED | OUTPATIENT
Start: 2024-03-27 | End: 2024-03-28

## 2024-03-27 RX ORDER — DIPHENHYDRAMINE HYDROCHLORIDE 50 MG/ML
12.5 INJECTION INTRAMUSCULAR; INTRAVENOUS EVERY 8 HOURS PRN
Status: DISCONTINUED | OUTPATIENT
Start: 2024-03-27 | End: 2024-03-28 | Stop reason: HOSPADM

## 2024-03-27 RX ORDER — SODIUM CHLORIDE 9 MG/ML
40 INJECTION, SOLUTION INTRAVENOUS AS NEEDED
Status: DISCONTINUED | OUTPATIENT
Start: 2024-03-27 | End: 2024-03-28 | Stop reason: HOSPADM

## 2024-03-27 RX ORDER — OXYTOCIN/0.9 % SODIUM CHLORIDE 30/500 ML
999 PLASTIC BAG, INJECTION (ML) INTRAVENOUS ONCE
Status: DISCONTINUED | OUTPATIENT
Start: 2024-03-27 | End: 2024-03-28 | Stop reason: HOSPADM

## 2024-03-27 RX ORDER — FAMOTIDINE 10 MG/ML
20 INJECTION, SOLUTION INTRAVENOUS ONCE AS NEEDED
Status: DISCONTINUED | OUTPATIENT
Start: 2024-03-27 | End: 2024-03-28 | Stop reason: HOSPADM

## 2024-03-27 RX ORDER — EPHEDRINE SULFATE 50 MG/ML
5 INJECTION, SOLUTION INTRAVENOUS
Status: DISCONTINUED | OUTPATIENT
Start: 2024-03-27 | End: 2024-03-28 | Stop reason: HOSPADM

## 2024-03-27 RX ORDER — ONDANSETRON 4 MG/1
4 TABLET, ORALLY DISINTEGRATING ORAL EVERY 6 HOURS PRN
Status: DISCONTINUED | OUTPATIENT
Start: 2024-03-27 | End: 2024-03-28 | Stop reason: HOSPADM

## 2024-03-27 RX ORDER — TRANEXAMIC ACID 10 MG/ML
1000 INJECTION, SOLUTION INTRAVENOUS ONCE AS NEEDED
Status: DISCONTINUED | OUTPATIENT
Start: 2024-03-27 | End: 2024-03-28

## 2024-03-27 RX ORDER — FAMOTIDINE 10 MG/ML
20 INJECTION, SOLUTION INTRAVENOUS 2 TIMES DAILY PRN
Status: DISCONTINUED | OUTPATIENT
Start: 2024-03-27 | End: 2024-03-28 | Stop reason: HOSPADM

## 2024-03-27 RX ORDER — SODIUM CHLORIDE 0.9 % (FLUSH) 0.9 %
10 SYRINGE (ML) INJECTION AS NEEDED
Status: DISCONTINUED | OUTPATIENT
Start: 2024-03-27 | End: 2024-03-28 | Stop reason: HOSPADM

## 2024-03-27 RX ORDER — FENTANYL/ROPIVACAINE/NS/PF 2MCG/ML-.2
10 PLASTIC BAG, INJECTION (ML) INJECTION CONTINUOUS
Status: DISCONTINUED | OUTPATIENT
Start: 2024-03-27 | End: 2024-03-28

## 2024-03-27 RX ORDER — METHYLERGONOVINE MALEATE 0.2 MG/ML
200 INJECTION INTRAVENOUS ONCE AS NEEDED
Status: DISCONTINUED | OUTPATIENT
Start: 2024-03-27 | End: 2024-03-28 | Stop reason: HOSPADM

## 2024-03-27 RX ORDER — ONDANSETRON 2 MG/ML
4 INJECTION INTRAMUSCULAR; INTRAVENOUS EVERY 6 HOURS PRN
Status: DISCONTINUED | OUTPATIENT
Start: 2024-03-27 | End: 2024-03-28 | Stop reason: HOSPADM

## 2024-03-27 RX ORDER — SODIUM CHLORIDE 0.9 % (FLUSH) 0.9 %
10 SYRINGE (ML) INJECTION EVERY 12 HOURS SCHEDULED
Status: DISCONTINUED | OUTPATIENT
Start: 2024-03-27 | End: 2024-03-28 | Stop reason: HOSPADM

## 2024-03-27 RX ORDER — MISOPROSTOL 200 UG/1
800 TABLET ORAL ONCE AS NEEDED
Status: DISCONTINUED | OUTPATIENT
Start: 2024-03-27 | End: 2024-03-28 | Stop reason: HOSPADM

## 2024-03-27 RX ORDER — LIDOCAINE HYDROCHLORIDE 10 MG/ML
0.5 INJECTION, SOLUTION INFILTRATION; PERINEURAL ONCE AS NEEDED
Status: DISCONTINUED | OUTPATIENT
Start: 2024-03-27 | End: 2024-03-28 | Stop reason: HOSPADM

## 2024-03-27 RX ORDER — MAGNESIUM CARB/ALUMINUM HYDROX 105-160MG
30 TABLET,CHEWABLE ORAL ONCE AS NEEDED
Status: DISCONTINUED | OUTPATIENT
Start: 2024-03-27 | End: 2024-03-28 | Stop reason: HOSPADM

## 2024-03-27 RX ORDER — TERBUTALINE SULFATE 1 MG/ML
0.25 INJECTION, SOLUTION SUBCUTANEOUS AS NEEDED
Status: DISCONTINUED | OUTPATIENT
Start: 2024-03-27 | End: 2024-03-28 | Stop reason: HOSPADM

## 2024-03-27 RX ORDER — FAMOTIDINE 20 MG/1
20 TABLET, FILM COATED ORAL 2 TIMES DAILY PRN
Status: DISCONTINUED | OUTPATIENT
Start: 2024-03-27 | End: 2024-03-28 | Stop reason: HOSPADM

## 2024-03-27 RX ORDER — OXYTOCIN/0.9 % SODIUM CHLORIDE 30/500 ML
125 PLASTIC BAG, INJECTION (ML) INTRAVENOUS ONCE AS NEEDED
Status: COMPLETED | OUTPATIENT
Start: 2024-03-27 | End: 2024-03-27

## 2024-03-27 RX ORDER — NALOXONE HCL 0.4 MG/ML
0.4 VIAL (ML) INJECTION
Status: DISCONTINUED | OUTPATIENT
Start: 2024-03-27 | End: 2024-03-28 | Stop reason: HOSPADM

## 2024-03-27 RX ORDER — MORPHINE SULFATE 2 MG/ML
1 INJECTION, SOLUTION INTRAMUSCULAR; INTRAVENOUS EVERY 4 HOURS PRN
Status: DISCONTINUED | OUTPATIENT
Start: 2024-03-27 | End: 2024-03-28 | Stop reason: HOSPADM

## 2024-03-27 RX ORDER — CARBOPROST TROMETHAMINE 250 UG/ML
250 INJECTION, SOLUTION INTRAMUSCULAR
Status: DISCONTINUED | OUTPATIENT
Start: 2024-03-27 | End: 2024-03-28 | Stop reason: HOSPADM

## 2024-03-27 RX ORDER — ACETAMINOPHEN 325 MG/1
650 TABLET ORAL EVERY 4 HOURS PRN
Status: DISCONTINUED | OUTPATIENT
Start: 2024-03-27 | End: 2024-03-28 | Stop reason: HOSPADM

## 2024-03-27 RX ORDER — OXYTOCIN/0.9 % SODIUM CHLORIDE 30/500 ML
2-20 PLASTIC BAG, INJECTION (ML) INTRAVENOUS
Status: DISCONTINUED | OUTPATIENT
Start: 2024-03-27 | End: 2024-03-28 | Stop reason: HOSPADM

## 2024-03-27 RX ADMIN — Medication 10 ML/HR: at 19:34

## 2024-03-27 RX ADMIN — SODIUM CHLORIDE, POTASSIUM CHLORIDE, SODIUM LACTATE AND CALCIUM CHLORIDE 125 ML/HR: 600; 310; 30; 20 INJECTION, SOLUTION INTRAVENOUS at 19:40

## 2024-03-27 RX ADMIN — LIDOCAINE HYDROCHLORIDE 3 ML: 10; .005 INJECTION, SOLUTION EPIDURAL; INFILTRATION; INTRACAUDAL; PERINEURAL at 19:30

## 2024-03-27 RX ADMIN — Medication 125 ML/HR: at 23:54

## 2024-03-27 RX ADMIN — SODIUM CHLORIDE, POTASSIUM CHLORIDE, SODIUM LACTATE AND CALCIUM CHLORIDE 125 ML/HR: 600; 310; 30; 20 INJECTION, SOLUTION INTRAVENOUS at 15:23

## 2024-03-27 RX ADMIN — Medication 2 MILLI-UNITS/MIN: at 15:25

## 2024-03-27 NOTE — H&P
H&P Note    Patient Identification:  Name: Feliberto Lim  Age: 33 y.o.  Sex: female  :  1990  MRN: [id number]                       Chief Complaint: Rupture of membranes    History of Present Illness:   33-year-old  5 para 1 presents at 40-1/7 weeks with complaints of a gush of clear fluid.  Rupture of membranes was confirmed while on labor and delivery.  Group B strep status is negative.  Fetal heart tones are reactive.  Pregnancy has been complicated by a fetal pelvic kidney.  The patient has been evaluated by neonatology and plan is for a renal ultrasound of the  after delivery.    Problem List:  @[facility]@  Past Medical History:  Past Medical History:   Diagnosis Date    History of medical termination of pregnancy 2022    Formatting of this note might be different from the original.   x5 in [place]    Hyperlipidemia 2022     Past Surgical History:  History reviewed. No pertinent surgical history.   Home Meds:  Medications Prior to Admission   Medication Sig Dispense Refill Last Dose    Prenatal Vit-Iron Carbonyl-FA (Prenatal Plus Iron) 29-1 MG tablet Take 1 tablet by mouth Daily. 30 tablet 11 3/26/2024     Current Meds:   [unfilled]  Allergies:  No Known Allergies  Immunizations:  Immunization History   Administered Date(s) Administered    COVID-19 (PFIZER) Purple Cap Monovalent 2022    COVID-19 F23 (PFIZER) 12YRS+ (COMIRNATY) 2022    Hep A / Hep B 2022    Hpv9 2022    Influenza Injectable Mdck Pf Quad 2022    MMRV 10/31/2022     Social History:   Social History     Tobacco Use    Smoking status: Never     Passive exposure: Never    Smokeless tobacco: Never   Substance Use Topics    Alcohol use: Never      Family History:  History reviewed. No pertinent family history.     Review of Systems  A comprehensive review of systems was negative.    Objective:  tMax 24 hrs: Temp (24hrs), Av.8 °F (36.6 °C), Min:97.8 °F (36.6 °C), Max:97.8 °F (36.6  °C)    Vitals Ranges:   Temp:  [97.8 °F (36.6 °C)] 97.8 °F (36.6 °C)  Heart Rate:  [] 111  Resp:  [16] 16  BP: (120-139)/(78-95) 135/82  Intake and Output Last 3 Shifts:   No intake/output data recorded.    Exam:     General Appearance:    Alert, cooperative, no distress, appears stated age   Head:    Normocephalic, without obvious abnormality, atraumatic   Back:     Symmetric, no curvature, ROM normal, no CVA tenderness   Lungs:     Clear to auscultation bilaterally, respirations unlabored   Chest Wall:    No tenderness or deformity    Heart:    Regular rate and rhythm, S1 and S2 normal, no murmur, rub   or gallop       Abdomen:   Soft, gravid.   Genitalia:  Cervix is 1 to 2 cm dilated per RN exam   Rectal:  Not examined today   Extremities: Equal in size   Skin:   Skin color, texture, turgor normal, no rashes or lesions   Lymph nodes:   Cervical, supraclavicular, and axillary nodes normal       Data Review: Nonstress test is reactive     Lab Results (last 24 hours)       Procedure Component Value Units Date/Time    T Pallidum Antibody w/ reflex RPR (Syphilis) [[id number]]  (Normal) Collected: 03/27/24 1338    Specimen: Blood Updated: 03/27/24 1413     Treponemal AB Total Non-Reactive    Protein / Creatinine Ratio, Urine - Urine, Clean Catch [[id number]] Collected: 03/27/24 1216    Specimen: Urine, Clean Catch Updated: 03/27/24 1324     Protein/Creatinine Ratio, Urine 192.9 mg/G Crea      Creatinine, Urine 89.7 mg/dL      Total Protein, Urine 17.3 mg/dL     Rapid Assay For ROM - Amniotic Fluid, Amniotic Sac [[id number]]  (Abnormal) Collected: 03/27/24 1202    Specimen: Amniotic Fluid from Amniotic Sac Updated: 03/27/24 1303     Rupture of Membranes Positive    Comprehensive Metabolic Panel [[id number]]  (Abnormal) Collected: 03/27/24 1216    Specimen: Blood Updated: 03/27/24 1245     Glucose 78 mg/dL      BUN 6 mg/dL      Creatinine 0.51 mg/dL      Sodium 136 mmol/L      Potassium 3.7 mmol/L      Chloride 104  mmol/L      CO2 19.7 mmol/L      Calcium 8.9 mg/dL      Total Protein 6.5 g/dL      Albumin 3.5 g/dL      ALT (SGPT) 35 U/L      AST (SGOT) 23 U/L      Alkaline Phosphatase 240 U/L      Total Bilirubin <0.2 mg/dL      Globulin 3.0 gm/dL      A/G Ratio 1.2 g/dL      BUN/Creatinine Ratio 11.8     Anion Gap 12.3 mmol/L      eGFR 128.2 mL/min/1.73     Narrative:      GFR Normal >60  Chronic Kidney Disease <60  Kidney Failure <15      Urinalysis With Culture If Indicated - Urine, Clean Catch [[id number]]  (Abnormal) Collected: 03/27/24 1202    Specimen: Urine, Clean Catch Updated: 03/27/24 1231     Color, UA Yellow     Appearance, UA Cloudy     pH, UA 7.5     Specific Gravity, UA 1.015     Glucose, UA Negative     Ketones, UA Negative     Bilirubin, UA Negative     Blood, UA Negative     Protein, UA Trace     Leuk Esterase, UA Negative     Nitrite, UA Negative     Urobilinogen, UA 0.2 E.U./dL    Narrative:      In absence of clinical symptoms, the presence of pyuria, bacteria, and/or nitrites on the urinalysis result does not correlate with infection.  Urine microscopic not indicated.    Urine Culture - Urine, Urine, Clean Catch [[id number]] Collected: 03/27/24 1202    Specimen: Urine, Clean Catch Updated: 03/27/24 1231    CBC & Differential [[id number]]  (Abnormal) Collected: 03/27/24 1216    Specimen: Blood Updated: 03/27/24 1227    Narrative:      The following orders were created for panel order CBC & Differential.  Procedure                               Abnormality         Status                     ---------                               -----------         ------                     CBC Auto Differential[[id number]]        Abnormal            Final result                 Please view results for these tests on the individual orders.    CBC Auto Differential [[id number]]  (Abnormal) Collected: 03/27/24 1216    Specimen: Blood Updated: 03/27/24 1227     WBC 9.39 10*3/mm3      RBC 4.76 10*6/mm3      Hemoglobin 13.1 g/dL       Hematocrit 38.8 %      MCV 81.5 fL      MCH 27.5 pg      MCHC 33.8 g/dL      RDW 15.7 %      RDW-SD 46.3 fl      MPV 12.0 fL      Platelets 185 10*3/mm3      Neutrophil % 67.1 %      Lymphocyte % 19.0 %      Monocyte % 11.5 %      Eosinophil % 0.9 %      Basophil % 0.2 %      Immature Grans % 1.3 %      Neutrophils, Absolute 6.31 10*3/mm3      Lymphocytes, Absolute 1.78 10*3/mm3      Monocytes, Absolute 1.08 10*3/mm3      Eosinophils, Absolute 0.08 10*3/mm3      Basophils, Absolute 0.02 10*3/mm3      Immature Grans, Absolute 0.12 10*3/mm3      nRBC 0.0 /100 WBC           Assessment:    Pregnancy      1.  Intrauterine pregnancy at 40-1/7 weeks  2.  Spontaneous rupture membranes  3.  Fetal pelvic kidney    Plan:  Admit, epidural on request.  Anticipate a vaginal delivery.  The patient has been evaluated by neonatology and a renal ultrasound of the baby has been recommended after delivery.    Sae Delgadillo MD  3/27/2024

## 2024-03-27 NOTE — ANESTHESIA PROCEDURE NOTES
Labor Epidural      Patient location during procedure: OB  Start Time: 3/27/2024 7:22 PM  Stop Time: 3/27/2024 7:30 PM  Indication:at surgeon's request  Performed By  Anesthesiologist: Hector Pop MD  Preanesthetic Checklist  Completed: patient identified, IV checked, site marked, risks and benefits discussed, surgical consent, monitors and equipment checked, pre-op evaluation and timeout performed  Prep:  Pt Position:sitting  Sterile Tech:cap, mask, sterile barrier and gloves  Prep:chlorhexidine gluconate and isopropyl alcohol  Monitoring:blood pressure monitoring, continuous pulse oximetry and EKG  Epidural Block Procedure:  Approach:midline  Guidance:landmark technique and palpation technique  Location:L3-L4  Needle Type:Tuohy  Needle Gauge:17 G  Loss of Resistance Medium: saline  Loss of Resistance: 5cm  Cath Depth at skin:11 cm  Paresthesia: none  Aspiration:negative  Test Dose:negative  Number of Attempts: 1  Post Assessment:  Dressing:secured with tape and occlusive dressing applied  Pt Tolerance:patient tolerated the procedure well with no apparent complications  Complications:no

## 2024-03-27 NOTE — ANESTHESIA PREPROCEDURE EVALUATION
Anesthesia Evaluation     Patient summary reviewed and Nursing notes reviewed                Airway   Mallampati: II  TM distance: >3 FB  Neck ROM: full  No difficulty expected  Dental - normal exam     Pulmonary - negative pulmonary ROS and normal exam   Cardiovascular - normal exam    (+) hyperlipidemia      Neuro/Psych- negative ROS  GI/Hepatic/Renal/Endo - negative ROS     Musculoskeletal (-) negative ROS    Abdominal    Substance History - negative use     OB/GYN    (+) Pregnant        Other                    Anesthesia Plan    ASA 2     epidural     (40w1d  )    Anesthetic plan, risks, benefits, and alternatives have been provided, discussed and informed consent has been obtained with: patient.    CODE STATUS:    Level Of Support Discussed With: Patient  Code Status (Patient has no pulse and is not breathing): CPR (Attempt to Resuscitate)  Medical Interventions (Patient has pulse or is breathing): Full Support

## 2024-03-27 NOTE — PLAN OF CARE
Problem: Adult Inpatient Plan of Care  Goal: Plan of Care Review  Outcome: Ongoing, Progressing  Flowsheets (Taken 3/27/2024 1812)  Progress: improving  Plan of Care Reviewed With:   patient   significant other  Outcome Evaluation: Patient presented to LD for SROM. Patient currently on 6mU of pitocin rating pain 2.5/10.  Goal: Patient-Specific Goal (Individualized)  Outcome: Ongoing, Progressing  Flowsheets (Taken 3/27/2024 1812)  Patient-Specific Goals (Include Timeframe): Healthy mom and baby by discharge  Individualized Care Needs: Interpretor needed, Epidural, TOY  Anxieties, Fears or Concerns: none noted  Goal: Absence of Hospital-Acquired Illness or Injury  Outcome: Ongoing, Progressing  Goal: Optimal Comfort and Wellbeing  Outcome: Ongoing, Progressing  Goal: Readiness for Transition of Care  Outcome: Ongoing, Progressing   Goal Outcome Evaluation:  Plan of Care Reviewed With: patient, significant other        Progress: improving  Outcome Evaluation: Patient presented to LD for SROM. Patient currently on 6mU of pitocin rating pain 2.5/10.

## 2024-03-27 NOTE — OBED NOTES
"[facility] Note OB        Patient Name: Feliberto Lim  YOB: 1990  MRN: [id number]  Admission Date: 3/27/2024 11:29 AM  Date of Service: 3/27/2024    Chief Complaint: Rupture of Membranes ([facility]; patient scheduled for IOL this morning but on hold.  Feels like her water broke last night around midnight.  Good FM, no VB, no regular contractions.)        Subjective     Feliberto Lim is a 33 y.o. female  at 40w1d with Estimated Date of Delivery: 3/26/24 who presents with the chief complaint listed above.  The patient states that she first noticed leaking fluid \"1.5 days ago\".  She denies headache, visual changes, chest pain, shortness of breath or RUQ pain.  She sees Sae Delgadillo MD for her prenatal care. Her pregnancy has been complicated by: fetal pelvic kidney    She describes fetal movement as normal.  She admits to leaking fluid.  She denies vaginal bleeding. She is feeling contractions.          Objective   Patient Active Problem List    Diagnosis     *Pregnancy [Z34.90]         OB History    Para Term  AB Living   5 1 1 0 3 1   SAB IAB Ectopic Molar Multiple Live Births   0 3 0 0 0 1      # Outcome Date GA Lbr Gal[place]ino/2nd Weight Sex Type Anes PTL Lv   5 Current            4 Term  40w0d   F Vag-Spont None  MARGY   3 IAB            2 IAB            1 IAB               Obstetric Comments    x 1.  7lbs 4 oz.          Past Medical History:   Diagnosis Date    History of medical termination of pregnancy 2022    Formatting of this note might be different from the original.   x5 in [place]    Hyperlipidemia 2022       History reviewed. No pertinent surgical history.    No current facility-administered medications on file prior to encounter.     Current Outpatient Medications on File Prior to Encounter   Medication Sig Dispense Refill    Prenatal Vit-Iron Carbonyl-FA (Prenatal Plus Iron) 29-1 MG tablet Take 1 tablet by mouth Daily. 30 tablet 11       No Known " Allergies    History reviewed. No pertinent family history.    Social History     Socioeconomic History    Marital status: Single   Tobacco Use    Smoking status: Never     Passive exposure: Never    Smokeless tobacco: Never   Vaping Use    Vaping status: Never Used   Substance and Sexual Activity    Alcohol use: Never    Drug use: Never    Sexual activity: Yes     Partners: Male           Review of Systems   Constitutional: Negative.    HENT: Negative.     Respiratory:  Negative for shortness of breath.    Cardiovascular:  Negative for chest pain.   Gastrointestinal:  Positive for abdominal pain.   Genitourinary:  Negative for vaginal bleeding.   Neurological:  Negative for headaches.        PHYSICAL EXAM:      VITAL SIGNS:  Vitals:    03/27/24 1201 03/27/24 1211 03/27/24 1221 03/27/24 1231   BP: 130/94 131/88 128/78 120/95   BP Location: Right arm      Patient Position: Lying      Pulse: 103 90 92 92   Resp: 16      Temp: 97.8 °F (36.6 °C)      TempSrc: Oral      SpO2: 99%      Height:            FHT'S:                   Baseline:  130 BPM  Variability:  Moderate = 6 - 25 BPM  Accelerations:  15 x 15 accelerations present     Decelerations:  absent  Contractions:   present     Interpretation:    Reactive NST, CAT 1 tracing        PHYSICAL EXAM:    General: well developed; well nourished  no acute distress   Heart: Not performed.   Lungs  : breathing is unlabored     Abdomen: soft, non-tender; no masses       Cervix: was checked (by RN): 1-2 cm / 70 % / -3  Cervical Dilation (cm): 1-2  Cervical Effacement: 70%  Fetal Station: -3  Cervical Consistency: soft  Cervical Position: posterior   Contractions: Irregular, every 6 - 8 minutes        Extremities: no pedal edema noted      LABS AND TESTING ORDERED:  Uterine and fetal monitoring  Urinalysis  CBC, CMP, urine protein/creatinine ratio    LAB RESULTS:    Recent Results (from the past 24 hour(s))   Urinalysis With Culture If Indicated - Urine, Clean Catch     Collection Time: 03/27/24 12:02 PM    Specimen: Urine, Clean Catch   Result Value Ref Range    Color, UA Yellow Yellow, Straw    Appearance, UA Cloudy (A) Clear    pH, UA 7.5 5.0 - 8.0    Specific Gravity, UA 1.015 1.005 - 1.030    Glucose, UA Negative Negative    Ketones, UA Negative Negative    Bilirubin, UA Negative Negative    Blood, UA Negative Negative    Protein, UA Trace (A) Negative    Leuk Esterase, UA Negative Negative    Nitrite, UA Negative Negative    Urobilinogen, UA 0.2 E.U./dL 0.2 - 1.0 E.U./dL   Rapid Assay For ROM - Amniotic Fluid, Amniotic Sac    Collection Time: 03/27/24 12:02 PM    Specimen: Amniotic Sac; Amniotic Fluid   Result Value Ref Range    Rupture of Membranes Positive (C) Negative   Comprehensive Metabolic Panel    Collection Time: 03/27/24 12:16 PM    Specimen: Blood   Result Value Ref Range    Glucose 78 65 - 99 mg/dL    BUN 6 6 - 20 mg/dL    Creatinine 0.51 (L) 0.57 - 1.00 mg/dL    Sodium 136 136 - 145 mmol/L    Potassium 3.7 3.5 - 5.2 mmol/L    Chloride 104 98 - 107 mmol/L    CO2 19.7 (L) 22.0 - 29.0 mmol/L    Calcium 8.9 8.6 - 10.5 mg/dL    Total Protein 6.5 6.0 - 8.5 g/dL    Albumin 3.5 3.5 - 5.2 g/dL    ALT (SGPT) 35 (H) 1 - 33 U/L    AST (SGOT) 23 1 - 32 U/L    Alkaline Phosphatase 240 (H) 39 - 117 U/L    Total Bilirubin <0.2 0.0 - 1.2 mg/dL    Globulin 3.0 gm/dL    A/G Ratio 1.2 g/dL    BUN/Creatinine Ratio 11.8 7.0 - 25.0    Anion Gap 12.3 5.0 - 15.0 mmol/L    eGFR 128.2 >60.0 mL/min/1.73   CBC Auto Differential    Collection Time: 03/27/24 12:16 PM    Specimen: Blood   Result Value Ref Range    WBC 9.39 3.40 - 10.80 10*3/mm3    RBC 4.76 3.77 - 5.28 10*6/mm3    Hemoglobin 13.1 12.0 - 15.9 g/dL    Hematocrit 38.8 34.0 - 46.6 %    MCV 81.5 79.0 - 97.0 fL    MCH 27.5 26.6 - 33.0 pg    MCHC 33.8 31.5 - 35.7 g/dL    RDW 15.7 (H) 12.3 - 15.4 %    RDW-SD 46.3 37.0 - 54.0 fl    MPV 12.0 6.0 - 12.0 fL    Platelets 185 140 - 450 10*3/mm3    Neutrophil % 67.1 42.7 - 76.0 %    Lymphocyte  % 19.0 (L) 19.6 - 45.3 %    Monocyte % 11.5 5.0 - 12.0 %    Eosinophil % 0.9 0.3 - 6.2 %    Basophil % 0.2 0.0 - 1.5 %    Immature Grans % 1.3 (H) 0.0 - 0.5 %    Neutrophils, Absolute 6.31 1.70 - 7.00 10*3/mm3    Lymphocytes, Absolute 1.78 0.70 - 3.10 10*3/mm3    Monocytes, Absolute 1.08 (H) 0.10 - 0.90 10*3/mm3    Eosinophils, Absolute 0.08 0.00 - 0.40 10*3/mm3    Basophils, Absolute 0.02 0.00 - 0.20 10*3/mm3    Immature Grans, Absolute 0.12 (H) 0.00 - 0.05 10*3/mm3    nRBC 0.0 0.0 - 0.2 /100 WBC       Lab Results   Component Value Date    ABO AB 2023    RH Positive 2023       Lab Results   Component Value Date    STREPGPB Negative 2024                 Assessment & Plan     ASSESSMENT/PLAN:  Feliberto Lim is a 33 y.o. female  at 40w1d who presented with: leaking fluid          Final Impression:  Pregnancy at 40w1d  Reactive NST.  CAT 1 tracing  Elevated diastolic blood pressure  ROM  GBS negative             Vitals:    24 1201 24 1211 24 1221 24 1231   BP: 130/94 131/88 128/78 120/95   BP Location: Right arm      Patient Position: Lying      Pulse: 103 90 92 92   Resp: 16      Temp: 97.8 °F (36.6 °C)      TempSrc: Oral      SpO2: 99%      Height:           Lab Results   Component Value Date    STREPGPB Negative 2024     Lab Results   Component Value Date    ABO AB 2023    RH Positive 2023         PLAN:     Admit for labor management  Plan discussed with patient and covering physician    I have spent 30 minutes including face to face time with the patient, greater than 50% in discussion of the diagnosis (counseling) and/or coordination of care.     Lisa Jerry MD  3/27/2024  13:17 EDT  OB Hospitalist  Phone:  [phone]

## 2024-03-28 LAB
BACTERIA SPEC AEROBE CULT: ABNORMAL
BASOPHILS # BLD AUTO: 0.02 10*3/MM3 (ref 0–0.2)
BASOPHILS NFR BLD AUTO: 0.2 % (ref 0–1.5)
DEPRECATED RDW RBC AUTO: 43.7 FL (ref 37–54)
EOSINOPHIL # BLD AUTO: 0.05 10*3/MM3 (ref 0–0.4)
EOSINOPHIL NFR BLD AUTO: 0.4 % (ref 0.3–6.2)
ERYTHROCYTE [DISTWIDTH] IN BLOOD BY AUTOMATED COUNT: 15.2 % (ref 12.3–15.4)
HCT VFR BLD AUTO: 35.6 % (ref 34–46.6)
HGB BLD-MCNC: 11.8 G/DL (ref 12–15.9)
IMM GRANULOCYTES # BLD AUTO: 0.13 10*3/MM3 (ref 0–0.05)
IMM GRANULOCYTES NFR BLD AUTO: 1.1 % (ref 0–0.5)
LYMPHOCYTES # BLD AUTO: 1.63 10*3/MM3 (ref 0.7–3.1)
LYMPHOCYTES NFR BLD AUTO: 13.4 % (ref 19.6–45.3)
MCH RBC QN AUTO: 26.6 PG (ref 26.6–33)
MCHC RBC AUTO-ENTMCNC: 33.1 G/DL (ref 31.5–35.7)
MCV RBC AUTO: 80.4 FL (ref 79–97)
MONOCYTES # BLD AUTO: 1.28 10*3/MM3 (ref 0.1–0.9)
MONOCYTES NFR BLD AUTO: 10.5 % (ref 5–12)
NEUTROPHILS NFR BLD AUTO: 74.4 % (ref 42.7–76)
NEUTROPHILS NFR BLD AUTO: 9.05 10*3/MM3 (ref 1.7–7)
NRBC BLD AUTO-RTO: 0 /100 WBC (ref 0–0.2)
PLATELET # BLD AUTO: 154 10*3/MM3 (ref 140–450)
PMV BLD AUTO: 11.3 FL (ref 6–12)
RBC # BLD AUTO: 4.43 10*6/MM3 (ref 3.77–5.28)
WBC NRBC COR # BLD AUTO: 12.16 10*3/MM3 (ref 3.4–10.8)

## 2024-03-28 PROCEDURE — 85025 COMPLETE CBC W/AUTO DIFF WBC: CPT | Performed by: OBSTETRICS & GYNECOLOGY

## 2024-03-28 PROCEDURE — 0503F POSTPARTUM CARE VISIT: CPT

## 2024-03-28 RX ORDER — SODIUM CHLORIDE 0.9 % (FLUSH) 0.9 %
1-10 SYRINGE (ML) INJECTION AS NEEDED
Status: DISCONTINUED | OUTPATIENT
Start: 2024-03-28 | End: 2024-03-29 | Stop reason: HOSPADM

## 2024-03-28 RX ORDER — HYDROCORTISONE 25 MG/G
CREAM TOPICAL AS NEEDED
Status: DISCONTINUED | OUTPATIENT
Start: 2024-03-28 | End: 2024-03-29 | Stop reason: HOSPADM

## 2024-03-28 RX ORDER — ONDANSETRON 2 MG/ML
4 INJECTION INTRAMUSCULAR; INTRAVENOUS EVERY 6 HOURS PRN
Status: DISCONTINUED | OUTPATIENT
Start: 2024-03-28 | End: 2024-03-29 | Stop reason: HOSPADM

## 2024-03-28 RX ORDER — TRAMADOL HYDROCHLORIDE 50 MG/1
50 TABLET ORAL EVERY 6 HOURS PRN
Status: DISCONTINUED | OUTPATIENT
Start: 2024-03-28 | End: 2024-03-29 | Stop reason: HOSPADM

## 2024-03-28 RX ORDER — PROMETHAZINE HYDROCHLORIDE 25 MG/1
25 TABLET ORAL EVERY 6 HOURS PRN
Status: DISCONTINUED | OUTPATIENT
Start: 2024-03-28 | End: 2024-03-29 | Stop reason: HOSPADM

## 2024-03-28 RX ORDER — ACETAMINOPHEN 325 MG/1
650 TABLET ORAL EVERY 6 HOURS PRN
Status: DISCONTINUED | OUTPATIENT
Start: 2024-03-28 | End: 2024-03-29 | Stop reason: HOSPADM

## 2024-03-28 RX ORDER — PROMETHAZINE HYDROCHLORIDE 12.5 MG/1
12.5 SUPPOSITORY RECTAL EVERY 6 HOURS PRN
Status: DISCONTINUED | OUTPATIENT
Start: 2024-03-28 | End: 2024-03-29 | Stop reason: HOSPADM

## 2024-03-28 RX ORDER — DOCUSATE SODIUM 100 MG/1
100 CAPSULE, LIQUID FILLED ORAL 2 TIMES DAILY
Status: DISCONTINUED | OUTPATIENT
Start: 2024-03-28 | End: 2024-03-29 | Stop reason: HOSPADM

## 2024-03-28 RX ORDER — BISACODYL 10 MG
10 SUPPOSITORY, RECTAL RECTAL DAILY PRN
Status: DISCONTINUED | OUTPATIENT
Start: 2024-03-28 | End: 2024-03-29 | Stop reason: HOSPADM

## 2024-03-28 RX ORDER — ONDANSETRON 4 MG/1
4 TABLET, ORALLY DISINTEGRATING ORAL EVERY 6 HOURS PRN
Status: DISCONTINUED | OUTPATIENT
Start: 2024-03-28 | End: 2024-03-29 | Stop reason: HOSPADM

## 2024-03-28 RX ORDER — PRENATAL VIT/IRON FUM/FOLIC AC 27MG-0.8MG
1 TABLET ORAL DAILY
Status: DISCONTINUED | OUTPATIENT
Start: 2024-03-28 | End: 2024-03-29 | Stop reason: HOSPADM

## 2024-03-28 RX ORDER — IBUPROFEN 600 MG/1
600 TABLET ORAL EVERY 6 HOURS PRN
Status: DISCONTINUED | OUTPATIENT
Start: 2024-03-28 | End: 2024-03-29 | Stop reason: HOSPADM

## 2024-03-28 RX ADMIN — DOCUSATE SODIUM 100 MG: 100 CAPSULE, LIQUID FILLED ORAL at 20:01

## 2024-03-28 RX ADMIN — IBUPROFEN 600 MG: 600 TABLET, FILM COATED ORAL at 07:44

## 2024-03-28 NOTE — L&D DELIVERY NOTE
[facility]   Vaginal Delivery Note    Patient Name: Feliberto Lim  : 1990  MRN: [id number]    Date of Delivery: 3/27/2024     Diagnosis     Pre & Post-Delivery:  Intrauterine pregnancy at 40w1d  Labor status: Spontaneous Onset of Labor      (normal spontaneous vaginal delivery)    Pregnancy             Problem List    Transfer to Postpartum     Review the Delivery Report for details.     Delivery     Delivery: Vaginal, Spontaneous     YOB: 2022    Time of Birth:  Gestational Age 10:46 PM   40w1d     Anesthesia: Epidural     Delivering clinician: Mari Dillard    Forceps?   No   Vacuum? No    Shoulder dystocia present: No        Delivery narrative: Patient is a 33-year-old -0-3-1 who presented at 40 weeks and 1 day with rupture of membranes.  She was 1 to 2 cm on admission and GBS negative.  Her pregnancy had been complicated by pelvic kidney seen on fetal ultrasound.  She was started on Pitocin for augmentation.  She received an epidural for pain control.  She progressed along a normal multiparous labor curve to complete dilation and +2 station.  She did have rupture of a fore bag for clear fluid just prior to pushing.      She pushed for approximately 15 minutes and delivered a liveborn male in the occiput anterior position over an intact perineum.  With gentle posterior guidance of the fetal head and maternal pushing, the right anterior shoulder was easily delivered, followed by the remainder of the body.  The infant was immediately vigorous and placed on mom's chest.  Delayed cord clamping was performed for 30 seconds and cord was clamped and cut by the father.  Cord blood was collected and sent.  The placenta then delivered spontaneously and was intact with a three-vessel cord.  This was sent to pathology.  Inspection of her perineum revealed no lacerations.  Bimanual exam revealed a firm uterus with remaining products of conception.  She tolerated the procedure  "well.  All counts were correct at the end the procedure.          Infant     Findings: male  infant     Infant observations: Weight: No birth weight on file.   Length:   in  Observations/Comments:  mack rm 1      Apgars: 8  @ 1 minute /    9  @ 5 minutes   Infant Name:      Placenta & Cord         Placenta delivered  Expressed  at   3/27/2024 10:48 PM     Cord:   present.   Nuchal Cord?  no   Cord blood obtained: Yes    Cord gases obtained:  No    Cord gas results: Venous:  No results found for: \"PHCVEN\", \"BECVEN\"    Arterial:  No results found for: \"PHCART\", \"BECART\"     Repair     Episiotomy: None     No    Lacerations: No   Estimated Blood Loss:       Quantitative Blood Loss: Quantitative Blood Loss (mL): 104 mL (03/27/24 2315)        Complications     none    Disposition     Mother to Mother Baby/Postpartum  in stable condition currently.  Baby to remains with mom  in stable condition currently.    Mari Dillard MD  03/27/24  23:28 EDT        "

## 2024-03-28 NOTE — LACTATION NOTE
This note was copied from a baby's chart.   ID [id number]. . Mom bf other child 3 months. Bf several times since delivery and describes feeling tugging without pain. Her feeding plan is bf and formula but she has only bf so far and is doing well.   Reviewed bf education: ways to wake baby- STS, suck training, stimulation, HE colostrum.  Attempt feeding every 3 hours and when baby is alert, feeding cues present.   Normal  behavior including sleepiness- HE  or pump and give ebm if no latch achieved.  Proper way to position and steps for an effective latch,check nipple shape after feeds.   Weight and output expectations.  Infant stomach size  Full milk supply day 3-5.  Nipple care. Lanolin given with instructions.  Review Postpartum handbook pages 35-45[facility].  If going to supplement do so after bf.   Call LC for assistance. # on WB.  Has PBP.

## 2024-03-28 NOTE — ANESTHESIA POSTPROCEDURE EVALUATION
"Patient: Feliberto Lim    Procedure Summary       Date: 03/27/24 Room / Location:     Anesthesia Start: 1922 Anesthesia Stop: 2246    Procedure: LABOR ANALGESIA Diagnosis:     Scheduled Providers:  Provider: Hector Pop MD    Anesthesia Type: epidural ASA Status: 2            Anesthesia Type: epidural    Vitals  Vitals Value Taken Time   /85 03/28/24 0740   Temp 36.7 °C (98.1 °F) 03/28/24 0740   Pulse 97 03/28/24 0740   Resp 18 03/28/24 0740   SpO2 100 % 03/27/24 2245   Vitals shown include unfiled device data.        Post Anesthesia Care and Evaluation    Patient location during evaluation: bedside  Patient participation: complete - patient participated  Level of consciousness: awake and alert  Pain management: adequate    Airway patency: patent  Anesthetic complications: No anesthetic complications    Cardiovascular status: acceptable  Respiratory status: acceptable  Hydration status: acceptable    Comments: /85 (BP Location: Left arm, Patient Position: Sitting)   Pulse 97   Temp 36.7 °C (98.1 °F) (Oral)   Resp 18   Ht 155 cm (61.02\")   LMP 06/20/2023   SpO2 100%   Breastfeeding Yes   BMI 32.47 kg/m²     "

## 2024-03-28 NOTE — PROGRESS NOTES
VAGINAL DELIVERY POSTPARTUM DAY 1    3/28/2024  PATIENT: Feliberto Lim        MR#:[id number]  LOCATION: [facility]  DATE OF ADMISSION: 3/27/2024  ADMISSION  DIAGNOSIS:    (normal spontaneous vaginal delivery)    Pregnancy     CURRENT DIAGNOSIS: No notes have been filed under this hospital service.  Service: Hospitalist      SUBJECTIVE     Feliberto feels well.  Patient describes her lochia as less than menses.  Pain is well controlled.        OBJECTIVE   Temp: Temp:  [97.5 °F (36.4 °C)-99.2 °F (37.3 °C)] 98.1 °F (36.7 °C) Temp src: Oral   BP: BP: (108-139)/(61-99) 131/85        Pulse: Heart Rate:  [] 97  RR: Resp:  [16-18] 18    General:  Awake, alert, no acute distress   Cardiac: Regular rate and rhythm    Respiratory: Lungs clear bilaterally, normal respiratory effort    Abdomen: Soft, non-distended, fundus firm, below umbilicus, appropriately tender   Pelvis: deferred   Extremities: Calves NT bilaterally, DTR 2+, no clonus noted, trace edema     Lab Results   Component Value Date    WBC 12.16 (H) 2024    HGB 11.8 (L) 2024    HCT 35.6 2024     2024    AST 23 2024    ALT 35 (H) 2024    CREATININE 0.51 (L) 2024       ASSESSMENT:  Postpartum day 1 after vaginal delivery. Hgb: 11.8.    PLAN:Doing well. Continue routine supportive care. Discontinue IV, advance diet, may shower. Plan for discharge tomorrow as clinical picture allows.     Deanna Garcia CNM  09:52 EDT  2024

## 2024-03-28 NOTE — PLAN OF CARE
Goal Outcome Evaluation:            Progressing well, has voided twice, pain controlled, minimal bleeding, breastfeeding

## 2024-03-28 NOTE — LACTATION NOTE
This note was copied from a baby's chart.  Mom called for LC assist. Baby is currently latched to R breast and lips are flanged and has deep jaw rotation.  Mom reports latch is comfortable.  Mom is concerned that baby is not getting enough.  Educated on expected output, frequency of feedings, and how to tell if baby is getting enough.  Baby has had 2 wet and 2 BM's today and nursing every 2-3 hours for 15-20 min each breast.  Baby unlatched and fell asleep.  Encouraged Mom to rest between feedings and feed baby when showing early feeding cues.  LC number on whiteboard.  Encouraged to call when needed.

## 2024-03-28 NOTE — LACTATION NOTE
Pt reports baby is BF well. Educated on milk coming in, how to know if baby is getting enough to eat. Encouraged to call LC as needed. Used  [id number]    Lactation Consult Note    Evaluation Completed: 3/28/2024 09:07 EDT  Patient Name: Feliberto Lim  :  1990  MRN:  [id number]     REFERRAL  INFORMATION:                                         DELIVERY HISTORY:        Skin to skin initiation date/time: 3/27/2024  10:49 PM   Skin to skin end date/time:           MATERNAL ASSESSMENT:                               INFANT ASSESSMENT:  Information for the patient's :  Chema Dumont [[id number]]   No past medical history on file.                                                                                                   MATERNAL INFANT FEEDING:                                                                       EQUIPMENT TYPE:                                 BREAST PUMPING:          LACTATION REFERRALS:

## 2024-03-29 VITALS
DIASTOLIC BLOOD PRESSURE: 76 MMHG | HEIGHT: 61 IN | HEART RATE: 97 BPM | OXYGEN SATURATION: 100 % | RESPIRATION RATE: 18 BRPM | SYSTOLIC BLOOD PRESSURE: 130 MMHG | BODY MASS INDEX: 32.47 KG/M2 | TEMPERATURE: 97.6 F

## 2024-03-29 PROCEDURE — 0503F POSTPARTUM CARE VISIT: CPT | Performed by: NURSE PRACTITIONER

## 2024-03-29 PROCEDURE — 99238 HOSP IP/OBS DSCHRG MGMT 30/<: CPT | Performed by: NURSE PRACTITIONER

## 2024-03-29 RX ORDER — PSEUDOEPHEDRINE HCL 30 MG
100 TABLET ORAL 2 TIMES DAILY
Qty: 30 CAPSULE | Refills: 0 | Status: SHIPPED | OUTPATIENT
Start: 2024-03-29

## 2024-03-29 RX ORDER — IBUPROFEN 600 MG/1
600 TABLET ORAL EVERY 6 HOURS PRN
Qty: 90 TABLET | Refills: 1 | Status: SHIPPED | OUTPATIENT
Start: 2024-03-29

## 2024-03-29 RX ADMIN — Medication 1 TABLET: at 09:42

## 2024-03-29 RX ADMIN — DOCUSATE SODIUM 100 MG: 100 CAPSULE, LIQUID FILLED ORAL at 09:42

## 2024-03-29 NOTE — PLAN OF CARE
Goal Outcome Evaluation:            Progressing well, pain controlled, minimal bleeding. Voiding without diff, breast feeding

## 2024-03-29 NOTE — PROGRESS NOTES
Postpartum Progress Note      Status post Vaginal Delivery: Doing well postoperatively.     1) Postpartum care immediately following delivery :    Routine care. Discharge home today. Reviewed discharge instructions in detail.  used for this visit    2) Few elevated BP noted: Denies hx HTN. She is asymptomatic. Platelet count normal. AST normal, ALT slightly elevated at 35. Reviewed preeclampsia precautions in detail. She will RTO in one week for BP check    Hgb: 13.1-->11.8  Rh status: AB+  Syphilis screen in hospital: non-reactive  Rubella: Immune  Gender: Male, declines circumcision     Subjective     Postpartum Day 2: Vaginal delivery    The patient feels well. The patient denies emotional concerns. Pain is well controlled with current medications. The baby is well. The patient is ambulating well. The patient is tolerating a normal diet.     Objective     Vital signs in last 24 hours:  Temp:  [97.2 °F (36.2 °C)-97.6 °F (36.4 °C)] 97.5 °F (36.4 °C)  Heart Rate:  [] 94  Resp:  [16-20] 20  BP: (122-136)/(79-86) 122/79      General:    alert, appears stated age, and cooperative   CV: RRR, no m/r/g   Lungs: CTAB   Abdomen:  Soft, Non-tender    Lochia:  appropriate   Uterine Fundus:   firm   Ext    Edema trace   DVT Evaluation:  No evidence of DVT seen on physical exam.     Lab Results   Component Value Date    WBC 12.16 (H) 03/28/2024    HGB 11.8 (L) 03/28/2024    HCT 35.6 03/28/2024    MCV 80.4 03/28/2024     03/28/2024       Donna Montero, APRN  3/29/2024  09:13 EDT

## 2024-03-29 NOTE — DISCHARGE SUMMARY
Date of Discharge:  3/29/2024    Discharge Diagnosis: s/p vaginal delivery     Presenting Problem/History of Present Illness  Pregnancy [Z34.90]     Hospital Course  Patient is a 33 y.o. female presented with ruptured membranes at 40+1 weeks gestation.  Her pregnancy was fetal pelvic kidney.  She delivered a viable male infant weighing 7lb 13.8oz with apgars of 8&9. For further information surrounding this delivery please seen delivery note. Her postpartum course has been uncomplicated. She is voiding adequately, tolerating a regular diet, and she is ambulating without difficulty or assistance. Her pain is well controlled. We have reviewed discharge instructions in detail.     Procedures Performed         Consults:   Consults       No orders found from 2/27/2024 to 3/28/2024.            Pertinent Test Results:   WBC   Date Value Ref Range Status   03/28/2024 12.16 (H) 3.40 - 10.80 10*3/mm3 Final     RBC   Date Value Ref Range Status   03/28/2024 4.43 3.77 - 5.28 10*6/mm3 Final     Hemoglobin   Date Value Ref Range Status   03/28/2024 11.8 (L) 12.0 - 15.9 g/dL Final     Hematocrit   Date Value Ref Range Status   03/28/2024 35.6 34.0 - 46.6 % Final     MCV   Date Value Ref Range Status   03/28/2024 80.4 79.0 - 97.0 fL Final     MCH   Date Value Ref Range Status   03/28/2024 26.6 26.6 - 33.0 pg Final     MCHC   Date Value Ref Range Status   03/28/2024 33.1 31.5 - 35.7 g/dL Final     RDW   Date Value Ref Range Status   03/28/2024 15.2 12.3 - 15.4 % Final     RDW-SD   Date Value Ref Range Status   03/28/2024 43.7 37.0 - 54.0 fl Final     MPV   Date Value Ref Range Status   03/28/2024 11.3 6.0 - 12.0 fL Final     Platelets   Date Value Ref Range Status   03/28/2024 154 140 - 450 10*3/mm3 Final     Neutrophil %   Date Value Ref Range Status   03/28/2024 74.4 42.7 - 76.0 % Final     Lymphocyte %   Date Value Ref Range Status   03/28/2024 13.4 (L) 19.6 - 45.3 % Final     Monocyte %   Date Value Ref Range Status   03/28/2024  10.5 5.0 - 12.0 % Final     Eosinophil %   Date Value Ref Range Status   03/28/2024 0.4 0.3 - 6.2 % Final     Basophil %   Date Value Ref Range Status   03/28/2024 0.2 0.0 - 1.5 % Final     Immature Grans %   Date Value Ref Range Status   03/28/2024 1.1 (H) 0.0 - 0.5 % Final     Neutrophils, Absolute   Date Value Ref Range Status   03/28/2024 9.05 (H) 1.70 - 7.00 10*3/mm3 Final     Lymphocytes, Absolute   Date Value Ref Range Status   03/28/2024 1.63 0.70 - 3.10 10*3/mm3 Final     Monocytes, Absolute   Date Value Ref Range Status   03/28/2024 1.28 (H) 0.10 - 0.90 10*3/mm3 Final     Eosinophils, Absolute   Date Value Ref Range Status   03/28/2024 0.05 0.00 - 0.40 10*3/mm3 Final     Basophils, Absolute   Date Value Ref Range Status   03/28/2024 0.02 0.00 - 0.20 10*3/mm3 Final     Immature Grans, Absolute   Date Value Ref Range Status   03/28/2024 0.13 (H) 0.00 - 0.05 10*3/mm3 Final     nRBC   Date Value Ref Range Status   03/28/2024 0.0 0.0 - 0.2 /100 WBC Final       Condition on Discharge:  Stable    Vital Signs  Temp:  [97.2 °F (36.2 °C)-97.6 °F (36.4 °C)] 97.5 °F (36.4 °C)  Heart Rate:  [] 94  Resp:  [16-20] 20  BP: (122-136)/(79-86) 122/79    Physical Exam:   See Progress Note    Discharge Disposition  Home or Self Care    Discharge Medications     Discharge Medications        New Medications        Instructions Start Date   docusate sodium 100 MG capsule   100 mg, Oral, 2 Times Daily      ibuprofen 600 MG tablet  Commonly known as: ADVIL,MOTRIN   600 mg, Oral, Every 6 Hours PRN             Continue These Medications        Instructions Start Date   Prenatal Plus Iron 29-1 MG tablet   1 tablet, Oral, Daily               Discharge Diet: Regular    Activity at Discharge: Pelvic rest X6 weeks    Education: Warning signs and symptoms given, no tub baths, nothing in the vagina for 6 weeks, no driving for 2 weeks or while talking narcotics     Follow-up Appointments  No future appointments.  Additional  Instructions for the Follow-ups that You Need to Schedule       Discharge Follow-up with Specified Provider: Leona; 1 Week   As directed      To: Leona   Follow Up: 1 Week   Follow Up Details: BP check                Test Results Pending at Discharge       FRANK Boothe  03/29/24  09:17 EDT    Time: 09:17 EDT

## 2024-04-08 ENCOUNTER — MATERNAL SCREENING (OUTPATIENT)
Dept: CALL CENTER | Facility: HOSPITAL | Age: 34
End: 2024-04-08
Payer: COMMERCIAL

## 2024-04-08 NOTE — OUTREACH NOTE
Maternal Screening Survey      Flowsheet Row Responses   [facility]   Attempt successful? Yes  [[employer], [place], Jeanna, #[id number]]   Call start time    Call end time    EPD Scale: Able to Laugh 0-->as much as she always could   EPD Scale: Looked Forward 0-->as much as she ever did   EPD Scale: Blamed Self 0-->no, never   EPD Scale: Been Anxious 0-->no, not at all   EPD Scale: Felt Panicky 0-->no, not at all   EPD Scale: Things Getting on Top 0-->no, has been coping as well as ever   EPD Scale: Difficulty Sleeping 0-->no, not at all   EPD Scale: Sad or Miserable 0-->no, not at all   EPD Scale: Crying 0-->no, never   EPD Scale: Thought of Harming Self 0-->never   [place]  Depression Score 0   Did any of your parents have problems with alcohol or drug use? No   Do any of your peers have problems with alcohol or drug use? No   Does your partner have problems with alcohol or drug use? No   Before you were pregnant did you have problems with alcohol or drug use? (past) No   In the past month, did you drink beer, wine, liquor or use any other drugs? (pregnancy) No   Maternal Screening call completed Yes   Call end time               Siria GUERRERO - Registered Nurse

## 2024-04-08 NOTE — OUTREACH NOTE
Maternal Screening Survey      Flowsheet Row Responses   Facility patient discharged from? [place]   Attempt successful? No   Unsuccessful attempts Attempt 1              Siria GUERRERO - Registered Nurse

## 2024-05-07 ENCOUNTER — POSTPARTUM VISIT (OUTPATIENT)
Dept: OBSTETRICS AND GYNECOLOGY | Facility: CLINIC | Age: 34
End: 2024-05-07
Payer: COMMERCIAL

## 2024-05-07 VITALS
WEIGHT: 142 LBS | DIASTOLIC BLOOD PRESSURE: 84 MMHG | HEART RATE: 83 BPM | SYSTOLIC BLOOD PRESSURE: 120 MMHG | BODY MASS INDEX: 26.81 KG/M2

## 2024-05-07 DIAGNOSIS — Z30.09 BIRTH CONTROL COUNSELING: ICD-10-CM

## 2024-05-10 LAB
CYTOLOGIST CVX/VAG CYTO: NORMAL
CYTOLOGY CVX/VAG DOC CYTO: NORMAL
CYTOLOGY CVX/VAG DOC THIN PREP: NORMAL
DX ICD CODE: NORMAL
HPV I/H RISK 4 DNA CVX QL PROBE+SIG AMP: NEGATIVE
Lab: NORMAL
OTHER STN SPEC: NORMAL
STAT OF ADQ CVX/VAG CYTO-IMP: NORMAL

## 2024-05-21 ENCOUNTER — OFFICE VISIT (OUTPATIENT)
Dept: OBSTETRICS AND GYNECOLOGY | Facility: CLINIC | Age: 34
End: 2024-05-21
Payer: COMMERCIAL

## 2024-05-21 VITALS
SYSTOLIC BLOOD PRESSURE: 125 MMHG | WEIGHT: 141 LBS | HEIGHT: 61 IN | DIASTOLIC BLOOD PRESSURE: 83 MMHG | BODY MASS INDEX: 26.62 KG/M2

## 2024-05-21 DIAGNOSIS — Z30.017 NEXPLANON INSERTION: Primary | ICD-10-CM

## 2024-05-21 LAB
B-HCG UR QL: NEGATIVE
EXPIRATION DATE: NORMAL
INTERNAL NEGATIVE CONTROL: NEGATIVE
INTERNAL POSITIVE CONTROL: POSITIVE
Lab: NORMAL

## 2024-05-21 NOTE — PROGRESS NOTES
[place]   Feliberto Lim  is a 34 y.o. female who presents for placement of a Nexplanon.  We discussed the procedure itself as well as its benefits, risks and alternatives.  Her questions have been answered and she would like to proceed.  She has been abstinent for 2 weeks and her pregnancy test is negative today.  The patient is right-handed and would like to have the device in the left arm.    Chief Complaint   Patient presents with    Follow-up     Pt here today for office supplied nexplanon insertion        Past Medical History:   Diagnosis Date    History of medical termination of pregnancy 11/08/2022    Formatting of this note might be different from the original.   x5 in [place]    Hyperlipidemia 11/08/2022       History reviewed. No pertinent surgical history.    Social History     Socioeconomic History    Marital status: Single   Tobacco Use    Smoking status: Never     Passive exposure: Never    Smokeless tobacco: Never   Vaping Use    Vaping status: Never Used   Substance and Sexual Activity    Alcohol use: Never    Drug use: Never    Sexual activity: Yes     Partners: Male       The following portions of the patient's history were reviewed and updated as appropriate: allergies, current medications, past family history, past medical history, past social history, past surgical history and problem list.    Review of Systems          Physical Exam  Vitals and nursing note reviewed.   Constitutional:       Appearance: Normal appearance.   Skin:     Comments: Nexplanon was placed in the left upper arm.  Sterile technique was observed.  Nexplanon insertion guidelines were observed.  Xylocaine was used for local anesthetic.  The patient tolerated the procedure well.   Neurological:      Mental Status: She is alert and oriented to person, place, and time.         Assessment    Diagnoses and all orders for this visit:    1. Nexplanon insertion (Primary)  -     POC Pregnancy, Urine        Plan  Nexplanon was placed  as described above.  The patient tolerated the procedure well.  History of an atypical Pap at the beginning of pregnancy.  Pap was repeated 6 weeks postpartum and was negative.  I recommend a repeat Pap in 1 year.  Counseled.    No follow-ups on file.    Social History     Tobacco Use   Smoking Status Never    Passive exposure: Never   Smokeless Tobacco Never